# Patient Record
Sex: FEMALE | Race: WHITE | NOT HISPANIC OR LATINO | URBAN - METROPOLITAN AREA
[De-identification: names, ages, dates, MRNs, and addresses within clinical notes are randomized per-mention and may not be internally consistent; named-entity substitution may affect disease eponyms.]

---

## 2024-10-30 ENCOUNTER — OFFICE VISIT (OUTPATIENT)
Dept: OTOLARYNGOLOGY | Facility: CLINIC | Age: 79
End: 2024-10-30
Payer: COMMERCIAL

## 2024-10-30 VITALS — DIASTOLIC BLOOD PRESSURE: 102 MMHG | HEART RATE: 76 BPM | WEIGHT: 133.81 LBS | SYSTOLIC BLOOD PRESSURE: 151 MMHG

## 2024-10-30 DIAGNOSIS — J34.3 HYPERTROPHY OF INFERIOR NASAL TURBINATE: ICD-10-CM

## 2024-10-30 DIAGNOSIS — J20.9 ACUTE BRONCHITIS, UNSPECIFIED ORGANISM: ICD-10-CM

## 2024-10-30 DIAGNOSIS — J06.9 VIRAL URI WITH COUGH: Primary | ICD-10-CM

## 2024-10-30 DIAGNOSIS — L01.00 IMPETIGO: ICD-10-CM

## 2024-10-30 PROCEDURE — 1101F PT FALLS ASSESS-DOCD LE1/YR: CPT | Mod: CPTII,S$GLB,, | Performed by: PHYSICIAN ASSISTANT

## 2024-10-30 PROCEDURE — 99204 OFFICE O/P NEW MOD 45 MIN: CPT | Mod: S$GLB,,, | Performed by: PHYSICIAN ASSISTANT

## 2024-10-30 PROCEDURE — 1159F MED LIST DOCD IN RCRD: CPT | Mod: CPTII,S$GLB,, | Performed by: PHYSICIAN ASSISTANT

## 2024-10-30 PROCEDURE — 3077F SYST BP >= 140 MM HG: CPT | Mod: CPTII,S$GLB,, | Performed by: PHYSICIAN ASSISTANT

## 2024-10-30 PROCEDURE — 1126F AMNT PAIN NOTED NONE PRSNT: CPT | Mod: CPTII,S$GLB,, | Performed by: PHYSICIAN ASSISTANT

## 2024-10-30 PROCEDURE — 3080F DIAST BP >= 90 MM HG: CPT | Mod: CPTII,S$GLB,, | Performed by: PHYSICIAN ASSISTANT

## 2024-10-30 PROCEDURE — 99999 PR PBB SHADOW E&M-NEW PATIENT-LVL III: CPT | Mod: PBBFAC,,, | Performed by: PHYSICIAN ASSISTANT

## 2024-10-30 PROCEDURE — 3288F FALL RISK ASSESSMENT DOCD: CPT | Mod: CPTII,S$GLB,, | Performed by: PHYSICIAN ASSISTANT

## 2024-10-30 RX ORDER — MUPIROCIN 20 MG/G
OINTMENT TOPICAL 2 TIMES DAILY
Qty: 22 G | Refills: 0 | Status: SHIPPED | OUTPATIENT
Start: 2024-10-30 | End: 2024-11-06

## 2024-10-30 RX ORDER — BENZONATATE 100 MG/1
100 CAPSULE ORAL 3 TIMES DAILY PRN
Qty: 30 CAPSULE | Refills: 0 | Status: SHIPPED | OUTPATIENT
Start: 2024-10-30 | End: 2024-11-09

## 2024-10-30 RX ORDER — AZITHROMYCIN 250 MG/1
TABLET, FILM COATED ORAL
Qty: 6 TABLET | Refills: 0 | Status: SHIPPED | OUTPATIENT
Start: 2024-10-30 | End: 2024-11-04

## 2024-10-30 RX ORDER — MELOXICAM 15 MG/1
TABLET ORAL
COMMUNITY
Start: 2024-01-31

## 2024-10-30 RX ORDER — FLUTICASONE PROPIONATE 50 MCG
2 SPRAY, SUSPENSION (ML) NASAL DAILY
Qty: 16 G | Refills: 11 | Status: SHIPPED | OUTPATIENT
Start: 2024-10-30 | End: 2025-10-30

## 2024-11-18 PROBLEM — N18.31 STAGE 3A CHRONIC KIDNEY DISEASE: Status: ACTIVE | Noted: 2023-04-27

## 2024-11-18 PROBLEM — E78.00 PURE HYPERCHOLESTEROLEMIA: Status: ACTIVE | Noted: 2020-11-03

## 2024-11-18 PROBLEM — M17.11 PRIMARY OSTEOARTHRITIS OF RIGHT KNEE: Status: ACTIVE | Noted: 2023-04-27

## 2024-11-18 PROBLEM — M47.816 SPONDYLOSIS OF LUMBAR REGION WITHOUT MYELOPATHY OR RADICULOPATHY: Status: ACTIVE | Noted: 2019-07-10

## 2024-11-18 PROBLEM — I10 ESSENTIAL HYPERTENSION: Status: ACTIVE | Noted: 2023-04-27

## 2024-11-18 NOTE — PROGRESS NOTES
Blessing Salmon  1945        Subjective     Chief Complaint: Establish Care      History of Present Illness:  Ms. Blessing Salmon is a 79 y.o. female who presents to clinic for establishing care. Recently moved to [facility].      History of Present Illness    CHIEF COMPLAINT:  Ms. Salmon presents today for establishing care with a new PCP after moving from [place].    MEDICAL HISTORY:  She has a history of hypertension with suspected white coat syndrome. Home blood pressure readings typically range from  systolic. She was previously prescribed amlodipine 2.5 mg but did not renew it after moving. She has arthritis, managed with meloxicam 15 mg every other day as prescribed by her orthopedic surgeon. She uses Voltaren gel for topical pain relief and has received gel shots in her right knee. She had endometrial cancer in , treated with hysterectomy without chemotherapy. She has chronic back pain following surgery for a pinched sciatic nerve related to her arthritis. She has irritable bowel syndrome managed with stool softeners. She has eye implants for cataracts and uses OTC tear drops and allergy medication. She has sleep apnea and uses a nose strip at night, finding CPAP masks uncomfortable.    CURRENT MEDICATIONS:  Meloxicam 15 mg every other day, flaxseed oil, multivitamins, eye medications for implants, stool softeners. She recently discontinued Flonase, which was prescribed for an upper respiratory infection three weeks ago.    FAMILY HISTORY:  Her mother  from complications of valve replacement surgery. Her father was blind with poor lung function due to smoking, worked in a steel mill, and lived to 86 years old.    SOCIAL HISTORY:  She is a former smoker who quit in . She denies current alcohol use, reporting poor tolerance in the past. She exercises daily on a treadmill. She lives with her family, including her daughter, son-in-law, granddaughter, and great-grandson.    DIET:  She reports  "sensitivity to sugar, describing herself as "on the verge of being hypoglycemic" according to another physician. She experiences sugar crashes and manages this by consuming protein before sugar and using peanut butter as a go-to food. She denies being diabetic. Her main meal is now in the evening, which she acknowledges is atypical for her age.      ROS:  Constitutional: -weight loss  Musculoskeletal: +back pain  Neurological: -weakness, -tremors         PAST HISTORY:     Past Medical History:   Diagnosis Date    HLD (hyperlipidemia)     HTN (hypertension)        Past Surgical History:   Procedure Laterality Date    BACK SURGERY      HYSTERECTOMY         Family History   Problem Relation Name Age of Onset    Valvular heart disease Mother      Blindness Father         Social History     Socioeconomic History    Marital status:    Tobacco Use    Smoking status: Former     Types: Cigarettes    Smokeless tobacco: Never   Substance and Sexual Activity    Alcohol use: Not Currently     Social Drivers of Health     Financial Resource Strain: Low Risk  (12/7/2021)    Received from [place], [place] and [facility]    Financial Resource Strain     How hard is it for you to pay for the very basics like food, housing, medical care, and heating?: Not very hard   Food Insecurity: No Food Insecurity (12/7/2021)    Received from [place], [place] and [facility]    Food Insecurity     In the past 12 months, have you worried that your food would run out before you had money to buy more?: Never true     In the past 12 months, did you run out of food and didn't have money to buy more?: Never true   Transportation Needs: Unknown (12/7/2021)    Received from [place], [place] and [facility]    Transportation Needs     In the past 12 months, has lack of transportation kept you from medical appointments or from getting medications?: " "No       MEDICATIONS & ALLERGIES:     Current Outpatient Medications on File Prior to Visit   Medication Sig    [DISCONTINUED] meloxicam (MOBIC) 15 MG tablet TAKE 1 TABLET(15 MG) BY MOUTH DAILY PRN.  TAKE WITH FOOD.    FLAXSEED OIL-OMEGA 3,6,9 ORAL Take by mouth.    multivit,iron,minerals/lutein (CENTRUM SILVER ULTRA WOMEN'S ORAL) Take by mouth.    [DISCONTINUED] amLODIPine (NORVASC) 2.5 MG tablet Take 2.5 mg by mouth once daily.    [DISCONTINUED] fluticasone propionate (FLONASE) 50 mcg/actuation nasal spray 2 sprays (100 mcg total) by Each Nostril route once daily.     No current facility-administered medications on file prior to visit.       Review of patient's allergies indicates:   Allergen Reactions    Fluorometholone Swelling       OBJECTIVE:     Vital Signs:  Vitals:    11/19/24 0824   BP: (!) 144/100   BP Location: Right arm   Patient Position: Sitting   Pulse: 67   Resp: 18   Temp: 97.8 °F (36.6 °C)   TempSrc: Oral   SpO2: 98%   Weight: 61.8 kg (136 lb 3.9 oz)   Height: 5' 3" (1.6 m)       Body mass index is 24.13 kg/m².     Physical Exam:  Physical Exam  Vitals and nursing note reviewed.   Constitutional:       General: She is not in acute distress.     Appearance: She is not ill-appearing.   HENT:      Head: Normocephalic and atraumatic.      Mouth/Throat:      Mouth: Mucous membranes are moist.      Pharynx: Oropharynx is clear. No oropharyngeal exudate or posterior oropharyngeal erythema.   Eyes:      Extraocular Movements: Extraocular movements intact.      Conjunctiva/sclera: Conjunctivae normal.   Cardiovascular:      Rate and Rhythm: Normal rate and regular rhythm.   Pulmonary:      Effort: Pulmonary effort is normal. No respiratory distress.      Breath sounds: Normal breath sounds. No wheezing or rales.   Chest:      Chest wall: No tenderness.   Abdominal:      Palpations: Abdomen is soft.      Tenderness: There is no abdominal tenderness. There is no guarding.   Musculoskeletal:         General: " "Normal range of motion.      Cervical back: Normal range of motion and neck supple. No tenderness.      Right lower leg: No edema.      Left lower leg: No edema.   Lymphadenopathy:      Cervical: No cervical adenopathy.   Skin:     General: Skin is warm and dry.   Neurological:      Mental Status: She is alert and oriented to person, place, and time.            Laboratory  No results found for: "WBC", "HGB", "HCT", "MCV", "PLT"  No results found for: "GLU", "NA", "K", "CL", "CO2", "BUN", "CREATININE", "CALCIUM", "MG"  No results found for: "INR", "PROTIME"  No results found for: "HGBA1C"  No results for input(s): "POCTGLUCOSE" in the last 72 hours.      Health Maintenance         Date Due Completion Date    Lipid Panel Never done ---    TETANUS VACCINE Never done ---    Shingles Vaccine (2 of 3) 02/01/2017 12/7/2016    RSV Vaccine (Age 60+ and Pregnant patients) (1 - 1-dose 75+ series) Never done ---    COVID-19 Vaccine (4 - 2024-25 season) 09/01/2024 12/17/2021    DEXA Scan 07/11/2025 7/11/2022            ASSESSMENT & PLAN:   79 y.o. female who was seen today in clinic for establishing care    Hypertension, unspecified type  -     Hemoglobin A1C; Future; Expected date: 11/19/2024  -     Comprehensive Metabolic Panel; Future; Expected date: 11/19/2024  -     CBC Auto Differential; Future; Expected date: 11/19/2024  -     Lipid Panel; Future; Expected date: 11/19/2024  -     TSH; Future; Expected date: 11/19/2024    Pure hypercholesterolemia  -     Hemoglobin A1C; Future; Expected date: 11/19/2024  -     Comprehensive Metabolic Panel; Future; Expected date: 11/19/2024  -     CBC Auto Differential; Future; Expected date: 11/19/2024  -     Lipid Panel; Future; Expected date: 11/19/2024  -     TSH; Future; Expected date: 11/19/2024    EMMANUEL on CPAP    Spondylosis of lumbar spine  -     meloxicam (MOBIC) 15 MG tablet; Take 1 tablet (15 mg total) by mouth once daily.  Dispense: 30 tablet; Refill: 2    Primary osteoarthritis " of right knee  -     meloxicam (MOBIC) 15 MG tablet; Take 1 tablet (15 mg total) by mouth once daily.  Dispense: 30 tablet; Refill: 2    Chronic pain of right knee  -     meloxicam (MOBIC) 15 MG tablet; Take 1 tablet (15 mg total) by mouth once daily.  Dispense: 30 tablet; Refill: 2    Need for vaccination  -     Influenza - High Dose (65+) (PF) (IM)         1. Hypertension, unspecified type    2. Pure hypercholesterolemia    3. EMMANUEL on CPAP    4. Spondylosis of lumbar spine    5. Primary osteoarthritis of right knee    6. Chronic pain of right knee    7. Need for vaccination        Not on medication.  Discussed low salt diet.  Patient with normal BP reported at home.  Asymptomatic today.  Instructed to keep log at home and follow up in clinic to consider starting medication however concern for dropping BP to low with reported SBP in 70s-80s at times at home without symptoms.  Diet controlled.  Will get repeat lipid panel today  3.  Not using CPAP at home.  Instructed continued use as possible cause of elevation in BP  4/5/6.  Stable, continue home meds and follow up with orthopedics as needed    Assessment & Plan    Reassessed hypertension management; white coat syndrome suspected due to discrepancy between office and home readings  Decided against restarting amlodipine due to patient's history of low blood pressure at home  Continued meloxicam 15mg for arthritis pain management, acknowledging potential risks of long-term use  Noted patient's discontinuation of CPAP use; will monitor for sleep apnea symptoms or impact on blood pressure control  Considered potential link between uncontrolled hypertension and sleep apnea  Evaluated kidney function in context of NSAID use    ESSENTIAL HYPERTENSION:  - Discussed potential link between uncontrolled sleep apnea and hypertension.  - Ms. Shining to check blood pressure at home 2 times daily: once upon waking and once later in the day.  - Ms. [place] to keep a log of blood  pressure readings.  - Follow up in 1 month to reassess blood pressure control.  - Contact the office if home blood pressure readings consistently show systolic in 130s-140s or diastolic in 90s-100s.    HYPERLIPIDEMIA:  - Educated on Mediterranean diet for cholesterol management.    OSTEOARTHRITIS (RIGHT KNEE):  - Ms. [place] to continue using Voltaren gel for topical pain relief.  - Recommend incorporating Tylenol use to reduce frequency of meloxicam intake.  - Continued meloxicam 15mg, take every other day with food if possible, or as needed for pain.  - Refilled meloxicam prescription.    GENERAL HEALTH MONITORING:  - Comprehensive lab work ordered including cholesterol, electrolytes, and kidney function tests.    MEDICATION SAFETY:  - Explained risks associated with long-term NSAID use, including GI bleeding and kidney issues.             RTC in 1 month or sooner if needed    Elvis Jolly MD  [facility]    This note was generated with the assistance of ambient listening technology. Verbal consent was obtained by the patient and accompanying visitor(s) for the recording of patient appointment to facilitate this note. I attest to having reviewed and edited the generated note for accuracy, though some syntax or spelling errors may persist. Please contact the author of this note for any clarification.

## 2024-11-19 ENCOUNTER — TELEPHONE (OUTPATIENT)
Dept: INTERNAL MEDICINE | Facility: CLINIC | Age: 79
End: 2024-11-19

## 2024-11-19 ENCOUNTER — LAB VISIT (OUTPATIENT)
Dept: LAB | Facility: HOSPITAL | Age: 79
End: 2024-11-19
Payer: MEDICARE

## 2024-11-19 ENCOUNTER — OFFICE VISIT (OUTPATIENT)
Dept: INTERNAL MEDICINE | Facility: CLINIC | Age: 79
End: 2024-11-19
Payer: MEDICAID

## 2024-11-19 VITALS
SYSTOLIC BLOOD PRESSURE: 144 MMHG | HEIGHT: 63 IN | OXYGEN SATURATION: 98 % | BODY MASS INDEX: 24.14 KG/M2 | WEIGHT: 136.25 LBS | RESPIRATION RATE: 18 BRPM | TEMPERATURE: 98 F | DIASTOLIC BLOOD PRESSURE: 100 MMHG | HEART RATE: 67 BPM

## 2024-11-19 DIAGNOSIS — M47.816 SPONDYLOSIS OF LUMBAR SPINE: ICD-10-CM

## 2024-11-19 DIAGNOSIS — Z23 NEED FOR VACCINATION: ICD-10-CM

## 2024-11-19 DIAGNOSIS — M17.11 PRIMARY OSTEOARTHRITIS OF RIGHT KNEE: ICD-10-CM

## 2024-11-19 DIAGNOSIS — E78.00 PURE HYPERCHOLESTEROLEMIA: ICD-10-CM

## 2024-11-19 DIAGNOSIS — N18.30 STAGE 3 CHRONIC KIDNEY DISEASE, UNSPECIFIED WHETHER STAGE 3A OR 3B CKD: ICD-10-CM

## 2024-11-19 DIAGNOSIS — G89.29 CHRONIC PAIN OF RIGHT KNEE: ICD-10-CM

## 2024-11-19 DIAGNOSIS — I10 HYPERTENSION, UNSPECIFIED TYPE: ICD-10-CM

## 2024-11-19 DIAGNOSIS — M25.561 CHRONIC PAIN OF RIGHT KNEE: ICD-10-CM

## 2024-11-19 DIAGNOSIS — I10 HYPERTENSION, UNSPECIFIED TYPE: Primary | ICD-10-CM

## 2024-11-19 DIAGNOSIS — G47.33 OSA ON CPAP: ICD-10-CM

## 2024-11-19 PROBLEM — K58.1 IRRITABLE BOWEL SYNDROME WITH CONSTIPATION: Status: ACTIVE | Noted: 2020-11-03

## 2024-11-19 PROBLEM — M19.012 BILATERAL SHOULDER REGION ARTHRITIS: Status: ACTIVE | Noted: 2024-01-31

## 2024-11-19 PROBLEM — N89.0 VAIN I (VAGINAL INTRAEPITHELIAL NEOPLASIA GRADE I): Status: ACTIVE | Noted: 2017-10-17

## 2024-11-19 PROBLEM — M19.011 BILATERAL SHOULDER REGION ARTHRITIS: Status: ACTIVE | Noted: 2024-01-31

## 2024-11-19 PROBLEM — N18.31 STAGE 3A CHRONIC KIDNEY DISEASE: Status: RESOLVED | Noted: 2023-04-27 | Resolved: 2024-11-19

## 2024-11-19 LAB
ALBUMIN SERPL BCP-MCNC: 4.1 G/DL (ref 3.5–5.2)
ALP SERPL-CCNC: 55 U/L (ref 40–150)
ALT SERPL W/O P-5'-P-CCNC: 20 U/L (ref 10–44)
ANION GAP SERPL CALC-SCNC: 10 MMOL/L (ref 8–16)
AST SERPL-CCNC: 25 U/L (ref 10–40)
BASOPHILS # BLD AUTO: 0.09 K/UL (ref 0–0.2)
BASOPHILS NFR BLD: 1.4 % (ref 0–1.9)
BILIRUB SERPL-MCNC: 0.3 MG/DL (ref 0.1–1)
BUN SERPL-MCNC: 22 MG/DL (ref 8–23)
CALCIUM SERPL-MCNC: 9.7 MG/DL (ref 8.7–10.5)
CHLORIDE SERPL-SCNC: 106 MMOL/L (ref 95–110)
CHOLEST SERPL-MCNC: 243 MG/DL (ref 120–199)
CHOLEST/HDLC SERPL: 3.5 {RATIO} (ref 2–5)
CO2 SERPL-SCNC: 24 MMOL/L (ref 23–29)
CREAT SERPL-MCNC: 1 MG/DL (ref 0.5–1.4)
DIFFERENTIAL METHOD BLD: ABNORMAL
EOSINOPHIL # BLD AUTO: 0.5 K/UL (ref 0–0.5)
EOSINOPHIL NFR BLD: 8.1 % (ref 0–8)
ERYTHROCYTE [DISTWIDTH] IN BLOOD BY AUTOMATED COUNT: 12.1 % (ref 11.5–14.5)
EST. GFR  (NO RACE VARIABLE): 57 ML/MIN/1.73 M^2
ESTIMATED AVG GLUCOSE: 94 MG/DL (ref 68–131)
GLUCOSE SERPL-MCNC: 85 MG/DL (ref 70–110)
HBA1C MFR BLD: 4.9 % (ref 4–5.6)
HCT VFR BLD AUTO: 42.2 % (ref 37–48.5)
HDLC SERPL-MCNC: 70 MG/DL (ref 40–75)
HDLC SERPL: 28.8 % (ref 20–50)
HGB BLD-MCNC: 13.5 G/DL (ref 12–16)
IMM GRANULOCYTES # BLD AUTO: 0.04 K/UL (ref 0–0.04)
IMM GRANULOCYTES NFR BLD AUTO: 0.6 % (ref 0–0.5)
LDLC SERPL CALC-MCNC: 150.8 MG/DL (ref 63–159)
LYMPHOCYTES # BLD AUTO: 1.3 K/UL (ref 1–4.8)
LYMPHOCYTES NFR BLD: 21 % (ref 18–48)
MCH RBC QN AUTO: 29.7 PG (ref 27–31)
MCHC RBC AUTO-ENTMCNC: 32 G/DL (ref 32–36)
MCV RBC AUTO: 93 FL (ref 82–98)
MONOCYTES # BLD AUTO: 0.5 K/UL (ref 0.3–1)
MONOCYTES NFR BLD: 7.8 % (ref 4–15)
NEUTROPHILS # BLD AUTO: 3.9 K/UL (ref 1.8–7.7)
NEUTROPHILS NFR BLD: 61.1 % (ref 38–73)
NONHDLC SERPL-MCNC: 173 MG/DL
NRBC BLD-RTO: 0 /100 WBC
PLATELET # BLD AUTO: 160 K/UL (ref 150–450)
PMV BLD AUTO: 11.4 FL (ref 9.2–12.9)
POTASSIUM SERPL-SCNC: 4.5 MMOL/L (ref 3.5–5.1)
PROT SERPL-MCNC: 7.2 G/DL (ref 6–8.4)
RBC # BLD AUTO: 4.54 M/UL (ref 4–5.4)
SODIUM SERPL-SCNC: 140 MMOL/L (ref 136–145)
TRIGL SERPL-MCNC: 111 MG/DL (ref 30–150)
TSH SERPL DL<=0.005 MIU/L-ACNC: 2.13 UIU/ML (ref 0.4–4)
WBC # BLD AUTO: 6.39 K/UL (ref 3.9–12.7)

## 2024-11-19 PROCEDURE — 1160F RVW MEDS BY RX/DR IN RCRD: CPT | Mod: CPTII,S$GLB,,

## 2024-11-19 PROCEDURE — 83036 HEMOGLOBIN GLYCOSYLATED A1C: CPT

## 2024-11-19 PROCEDURE — 85025 COMPLETE CBC W/AUTO DIFF WBC: CPT

## 2024-11-19 PROCEDURE — 99999 PR PBB SHADOW E&M-EST. PATIENT-LVL III: CPT | Mod: PBBFAC,,,

## 2024-11-19 PROCEDURE — 3288F FALL RISK ASSESSMENT DOCD: CPT | Mod: CPTII,S$GLB,,

## 2024-11-19 PROCEDURE — 3077F SYST BP >= 140 MM HG: CPT | Mod: CPTII,S$GLB,,

## 2024-11-19 PROCEDURE — 36415 COLL VENOUS BLD VENIPUNCTURE: CPT | Mod: PO

## 2024-11-19 PROCEDURE — 84443 ASSAY THYROID STIM HORMONE: CPT

## 2024-11-19 PROCEDURE — 99214 OFFICE O/P EST MOD 30 MIN: CPT | Mod: 25,S$GLB,,

## 2024-11-19 PROCEDURE — 1101F PT FALLS ASSESS-DOCD LE1/YR: CPT | Mod: CPTII,S$GLB,,

## 2024-11-19 PROCEDURE — 1159F MED LIST DOCD IN RCRD: CPT | Mod: CPTII,S$GLB,,

## 2024-11-19 PROCEDURE — 90662 IIV NO PRSV INCREASED AG IM: CPT | Mod: S$GLB,,,

## 2024-11-19 PROCEDURE — 90471 IMMUNIZATION ADMIN: CPT | Mod: S$GLB,,,

## 2024-11-19 PROCEDURE — 80061 LIPID PANEL: CPT

## 2024-11-19 PROCEDURE — 80053 COMPREHEN METABOLIC PANEL: CPT

## 2024-11-19 PROCEDURE — 1126F AMNT PAIN NOTED NONE PRSNT: CPT | Mod: CPTII,S$GLB,,

## 2024-11-19 PROCEDURE — 3080F DIAST BP >= 90 MM HG: CPT | Mod: CPTII,S$GLB,,

## 2024-11-19 RX ORDER — MELOXICAM 15 MG/1
15 TABLET ORAL DAILY
Qty: 30 TABLET | Refills: 2 | Status: SHIPPED | OUTPATIENT
Start: 2024-11-19 | End: 2025-02-17

## 2024-11-19 RX ORDER — AMLODIPINE BESYLATE 2.5 MG/1
2.5 TABLET ORAL DAILY
COMMUNITY
End: 2024-11-19

## 2024-11-19 NOTE — TELEPHONE ENCOUNTER
----- Message from Elvis Jolly MD sent at 11/19/2024  1:41 PM CST -----  Patient is not on mychart.  Can we please call and inform of normal lab results, everything is within the normal limits.    Thank you

## 2024-12-19 NOTE — PROGRESS NOTES
Blessing Salmon  1945        Subjective     Chief Complaint: Follow-up (1 mo for HTN)      History of Present Illness:  Ms. Blessing Salmon is a 79 y.o. female who presents to clinic for follow up.       Feeling well today.  Has noticed worsening hearing loss bilaterally without ringing in ears.  Denies headaches, changes in vision, chest pain, dyspnea, lightheadedness, dizziness. BP log brought into clinic today with fluctuating BP throughout the day with no pattern noticed.  Ranging from SBP 90s to 150s.          Review of Systems   Constitutional:  Negative for fever.   Eyes:  Negative for pain.   Respiratory:  Negative for shortness of breath.    Cardiovascular:  Negative for chest pain, palpitations and leg swelling.   Musculoskeletal:  Positive for joint pain.   Neurological:  Negative for dizziness and headaches.        PAST HISTORY:     Past Medical History:   Diagnosis Date    HLD (hyperlipidemia)     HTN (hypertension)        Past Surgical History:   Procedure Laterality Date    BACK SURGERY      HYSTERECTOMY         Family History   Problem Relation Name Age of Onset    Valvular heart disease Mother      Blindness Father         Social History     Socioeconomic History    Marital status:    Tobacco Use    Smoking status: Former     Types: Cigarettes    Smokeless tobacco: Never   Substance and Sexual Activity    Alcohol use: Not Currently     Social Drivers of Health     Financial Resource Strain: Low Risk  (12/7/2021)    Received from [place], [place] and [facility]    Financial Resource Strain     How hard is it for you to pay for the very basics like food, housing, medical care, and heating?: Not very hard   Food Insecurity: No Food Insecurity (12/7/2021)    Received from [place], [place] and [facility]    Food Insecurity     In the past 12 months, have you worried that your food would run out before you had money to buy  "more?: Never true     In the past 12 months, did you run out of food and didn't have money to buy more?: Never true   Transportation Needs: Unknown (12/7/2021)    Received from [place], [place] and [facility]    Transportation Needs     In the past 12 months, has lack of transportation kept you from medical appointments or from getting medications?: No       MEDICATIONS & ALLERGIES:     Current Outpatient Medications on File Prior to Visit   Medication Sig    FLAXSEED OIL-OMEGA 3,6,9 ORAL Take by mouth.    multivit,iron,minerals/lutein (CENTRUM SILVER ULTRA WOMEN'S ORAL) Take by mouth.    [DISCONTINUED] meloxicam (MOBIC) 15 MG tablet Take 1 tablet (15 mg total) by mouth once daily.     No current facility-administered medications on file prior to visit.       Review of patient's allergies indicates:   Allergen Reactions    Fluorometholone Swelling       OBJECTIVE:     Vital Signs:  Vitals:    12/20/24 0852 12/20/24 0856   BP: (!) 156/92 (!) 152/98   BP Location: Right arm Left arm   Patient Position: Sitting Sitting   Pulse: 72    Resp: 18    Temp: 97 °F (36.1 °C)    TempSrc: Temporal    SpO2: 98%    Weight: 61.4 kg (135 lb 5.8 oz)    Height: 5' 3" (1.6 m)        Body mass index is 23.98 kg/m².     Physical Exam:  Physical Exam  Vitals and nursing note reviewed.   Constitutional:       General: She is not in acute distress.     Appearance: She is not ill-appearing.   HENT:      Head: Normocephalic and atraumatic.      Mouth/Throat:      Mouth: Mucous membranes are moist.      Pharynx: Oropharynx is clear. No oropharyngeal exudate or posterior oropharyngeal erythema.   Eyes:      Extraocular Movements: Extraocular movements intact.      Conjunctiva/sclera: Conjunctivae normal.   Cardiovascular:      Rate and Rhythm: Normal rate and regular rhythm.   Pulmonary:      Effort: Pulmonary effort is normal. No respiratory distress.      Breath sounds: Normal breath sounds. No wheezing or " "rales.   Chest:      Chest wall: No tenderness.   Abdominal:      Palpations: Abdomen is soft.      Tenderness: There is no abdominal tenderness. There is no guarding.   Musculoskeletal:         General: Normal range of motion.      Cervical back: Normal range of motion and neck supple. No tenderness.      Right lower leg: No edema.      Left lower leg: No edema.   Lymphadenopathy:      Cervical: No cervical adenopathy.   Skin:     General: Skin is warm and dry.   Neurological:      Mental Status: She is alert and oriented to person, place, and time.            Laboratory  Lab Results   Component Value Date    WBC 6.39 11/19/2024    HGB 13.5 11/19/2024    HCT 42.2 11/19/2024    MCV 93 11/19/2024     11/19/2024     Lab Results   Component Value Date    GLU 85 11/19/2024     11/19/2024    K 4.5 11/19/2024     11/19/2024    CO2 24 11/19/2024    BUN 22 11/19/2024    CREATININE 1.0 11/19/2024    CALCIUM 9.7 11/19/2024     No results found for: "INR", "PROTIME"  Lab Results   Component Value Date    HGBA1C 4.9 11/19/2024     No results for input(s): "POCTGLUCOSE" in the last 72 hours.      Health Maintenance         Date Due Completion Date    TETANUS VACCINE Never done ---    Shingles Vaccine (2 of 3) 02/01/2017 12/7/2016    RSV Vaccine (Age 60+ and Pregnant patients) (1 - 1-dose 75+ series) Never done ---    COVID-19 Vaccine (4 - 2024-25 season) 09/01/2024 12/17/2021    DEXA Scan 07/11/2025 7/11/2022    Lipid Panel 11/19/2029 11/19/2024            ASSESSMENT & PLAN:   79 y.o. female who was seen today in clinic for follow up    Essential hypertension    EMMANUEL on CPAP    Spondylosis of lumbar spine  -     meloxicam (MOBIC) 15 MG tablet; Take 1 tablet (15 mg total) by mouth once daily.  Dispense: 30 tablet; Refill: 2    Primary osteoarthritis of right knee  -     meloxicam (MOBIC) 15 MG tablet; Take 1 tablet (15 mg total) by mouth once daily.  Dispense: 30 tablet; Refill: 2  -     Ambulatory " referral/consult to Sports Medicine; Future; Expected date: 12/27/2024    Chronic pain of right knee  -     meloxicam (MOBIC) 15 MG tablet; Take 1 tablet (15 mg total) by mouth once daily.  Dispense: 30 tablet; Refill: 2  -     Ambulatory referral/consult to Sports Medicine; Future; Expected date: 12/27/2024    Hearing loss, unspecified hearing loss type, unspecified laterality  -     Ambulatory referral/consult to Audiology; Future; Expected date: 12/27/2024    Other orders  -     amLODIPine (NORVASC) 2.5 MG tablet; Take 1 tablet (2.5 mg total) by mouth once daily.  Dispense: 90 tablet; Refill: 3         1. Essential hypertension    2. EMMANUEL on CPAP    3. Spondylosis of lumbar spine    4. Primary osteoarthritis of right knee    5. Chronic pain of right knee    6. Hearing loss, unspecified hearing loss type, unspecified laterality        BP today elevated.  BP log at home fluctuating with no discernable pattern recognized.  Will start low dose amlodipine 2.5mg.  Strict RTC/ED precautions given.  Low salt diet.  Instructed to keep BP log at home and reach out with concerning readings.  Stable, unable to tolerate mask, has tried multiple ones with skin breakdown.  Does not have CPAP machine anymore.  Consider sleep medicine referral however patient declining at this time.  3/4/5.  Stable, previously received intermittent injections in right knee.  Sports medicine referral sent.  Continue home meloxicam.  6.  Noticed worsening hearing loss bilaterally.  No tinnitus reported.  Hearing test ordered.        RTC in 3 months or sooner if needed    Elvis Jolly MD  [facility]

## 2024-12-20 ENCOUNTER — OFFICE VISIT (OUTPATIENT)
Dept: INTERNAL MEDICINE | Facility: CLINIC | Age: 79
End: 2024-12-20
Payer: MEDICARE

## 2024-12-20 VITALS
TEMPERATURE: 97 F | DIASTOLIC BLOOD PRESSURE: 98 MMHG | WEIGHT: 135.38 LBS | SYSTOLIC BLOOD PRESSURE: 152 MMHG | BODY MASS INDEX: 23.99 KG/M2 | HEIGHT: 63 IN | RESPIRATION RATE: 18 BRPM | OXYGEN SATURATION: 98 % | HEART RATE: 72 BPM

## 2024-12-20 DIAGNOSIS — I10 ESSENTIAL HYPERTENSION: Primary | ICD-10-CM

## 2024-12-20 DIAGNOSIS — M25.561 CHRONIC PAIN OF RIGHT KNEE: ICD-10-CM

## 2024-12-20 DIAGNOSIS — G47.33 OSA ON CPAP: ICD-10-CM

## 2024-12-20 DIAGNOSIS — G89.29 CHRONIC PAIN OF RIGHT KNEE: ICD-10-CM

## 2024-12-20 DIAGNOSIS — M47.816 SPONDYLOSIS OF LUMBAR SPINE: ICD-10-CM

## 2024-12-20 DIAGNOSIS — M17.11 PRIMARY OSTEOARTHRITIS OF RIGHT KNEE: ICD-10-CM

## 2024-12-20 DIAGNOSIS — H91.90 HEARING LOSS, UNSPECIFIED HEARING LOSS TYPE, UNSPECIFIED LATERALITY: ICD-10-CM

## 2024-12-20 PROCEDURE — 99999 PR PBB SHADOW E&M-EST. PATIENT-LVL IV: CPT | Mod: PBBFAC,,,

## 2024-12-20 PROCEDURE — 99214 OFFICE O/P EST MOD 30 MIN: CPT | Mod: PBBFAC,PO

## 2024-12-20 RX ORDER — AMLODIPINE BESYLATE 2.5 MG/1
2.5 TABLET ORAL DAILY
Qty: 90 TABLET | Refills: 3 | Status: SHIPPED | OUTPATIENT
Start: 2024-12-20 | End: 2025-12-20

## 2024-12-20 RX ORDER — MELOXICAM 15 MG/1
15 TABLET ORAL DAILY
Qty: 30 TABLET | Refills: 2 | Status: SHIPPED | OUTPATIENT
Start: 2024-12-20 | End: 2025-03-20

## 2025-01-14 ENCOUNTER — PATIENT MESSAGE (OUTPATIENT)
Dept: SPORTS MEDICINE | Facility: CLINIC | Age: 80
End: 2025-01-14
Payer: MEDICARE

## 2025-01-14 DIAGNOSIS — M25.561 RIGHT KNEE PAIN, UNSPECIFIED CHRONICITY: Primary | ICD-10-CM

## 2025-01-16 ENCOUNTER — CLINICAL SUPPORT (OUTPATIENT)
Dept: AUDIOLOGY | Facility: CLINIC | Age: 80
End: 2025-01-16
Payer: MEDICARE

## 2025-01-16 DIAGNOSIS — H90.3 SENSORINEURAL HEARING LOSS (SNHL) OF BOTH EARS: ICD-10-CM

## 2025-01-16 PROCEDURE — 92557 COMPREHENSIVE HEARING TEST: CPT | Mod: PBBFAC

## 2025-01-16 PROCEDURE — 99999 PR PBB SHADOW E&M-EST. PATIENT-LVL I: CPT | Mod: PBBFAC,,,

## 2025-01-16 PROCEDURE — 99211 OFF/OP EST MAY X REQ PHY/QHP: CPT | Mod: PBBFAC

## 2025-01-16 PROCEDURE — 92567 TYMPANOMETRY: CPT | Mod: PBBFAC

## 2025-01-16 NOTE — PROGRESS NOTES
History:  Blessing Salmon, a 79 y.o. female, was seen today for an audiologic evaluation. She reported she recently moved in with family after living alone for years, and they have been telling her she is not hearing well. She noted some difficulty understanding soft-spoken loved ones and understanding in noise. Patient denied otalgia, aural fullness, tinnitus, and vertigo.     Results:  Otoscopy revealed clear view of tympanic membrane in the right ear and non-occlusive cerumen in the left ear.  Tympanometry revealed Type A tympanogram in the right ear and Type A tympanogram in the left ear.   Pure tone audiometry revealed mild to moderately severe sensorineural hearing loss in both ears.  Speech reception thresholds were obtained at 25 dB HL in the right ear and 35 dB HL in the left ear.  Word recognition scores were 100% in the right ear and 92% in the left ear.    Recommendations:  Hearing aid consultation if communication difficulties impact quality of life. Patient was advised to contact her insurance to ask about in network providers for hearing aids.  Use hearing protection when in noise.  Return for follow-up audiologic evaluation annually or sooner if a change in hearing is noted.  Recommend ENT consult for ear cleanings as needed.

## 2025-02-07 DIAGNOSIS — M25.561 RIGHT KNEE PAIN, UNSPECIFIED CHRONICITY: Primary | ICD-10-CM

## 2025-02-12 NOTE — PROGRESS NOTES
Subjective:     Blessing Salmon     No chief complaint on file.        MINERVA Funk is a 79 y.o. female coming in today for *** knee pain that began *** {DAY/WEEK/MONTH/YEAR:34982} ago, referred by ***. Pt. describes the pain as a {Numbers; 1-10:87075}/10 {Desc; Pain:90234} pain that {does/does not:200015} radiate. There {WAS WAS NOT:57093} a fall/injury/ or trauma associated with the onset of symptoms. The pain is better with *** and worse with ***. Pt. Denies any other musculoskeletal complaints at this time.     Joint instability?  Mechanical locking/clicking?   Affecting ADL's?  Affecting sleep?    Occupation:     Review of Systems   Constitutional:  Negative for chills and fever.   Musculoskeletal:  Positive for joint pain. Negative for back pain, falls, myalgias and neck pain.   Neurological:  Negative for dizziness, tingling, focal weakness, weakness and headaches.       PAST MEDICAL HISTORY:   Past Medical History:   Diagnosis Date    HLD (hyperlipidemia)     HTN (hypertension)      PAST SURGICAL HISTORY:   Past Surgical History:   Procedure Laterality Date    BACK SURGERY      HYSTERECTOMY       FAMILY HISTORY:   Family History   Problem Relation Name Age of Onset    Valvular heart disease Mother      Blindness Father       SOCIAL HISTORY:   Social History     Socioeconomic History    Marital status:    Tobacco Use    Smoking status: Former     Types: Cigarettes    Smokeless tobacco: Never   Substance and Sexual Activity    Alcohol use: Not Currently     Social Drivers of Health     Financial Resource Strain: Low Risk  (12/7/2021)    Received from [facility] [place], [place] [facility]    Financial Resource Strain     How hard is it for you to pay for the very basics like food, housing, medical care, and heating?: Not very hard   Food Insecurity: No Food Insecurity (12/7/2021)    Received from [facility] [place] [place] [facility]     Food Insecurity     In the past 12 months, have you worried that your food would run out before you had money to buy more?: Never true     In the past 12 months, did you run out of food and didn't have money to buy more?: Never true   Transportation Needs: Unknown (12/7/2021)    Received from [place], [place] [facility]    Transportation Needs     In the past 12 months, has lack of transportation kept you from medical appointments or from getting medications?: No       MEDICATIONS:     Current Outpatient Medications:     amLODIPine (NORVASC) 2.5 MG tablet, Take 1 tablet (2.5 mg total) by mouth once daily., Disp: 90 tablet, Rfl: 3    FLAXSEED OIL-OMEGA 3,6,9 ORAL, Take by mouth., Disp: , Rfl:     meloxicam (MOBIC) 15 MG tablet, Take 1 tablet (15 mg total) by mouth once daily., Disp: 30 tablet, Rfl: 2    multivit,iron,minerals/lutein (CENTRUM SILVER ULTRA WOMEN'S ORAL), Take by mouth., Disp: , Rfl:   ALLERGIES:   Review of patient's allergies indicates:   Allergen Reactions    Fluorometholone Swelling       Objective:     VITAL SIGNS: LMP  (LMP Unknown)    General    Nursing note and vitals reviewed.  Constitutional: She is oriented to person, place, and time. She appears well-developed and well-nourished.   HENT:   Head: Normocephalic and atraumatic.   no nasal discharge, no external ear redness or discharge   Eyes:   EOM is full and smooth  No eye redness or discharge   Neck: Neck supple. No tracheal deviation present.   Cardiovascular:  Normal rate.            2+ Radial pulse bilaterally  2+ Dorsalis Pedis pulse bilaterally  No LE edema appreciated   Pulmonary/Chest: Effort normal. No respiratory distress.   Abdominal: She exhibits no distension.   No rigidity   Neurological: She is alert and oriented to person, place, and time. She exhibits normal muscle tone. Coordination normal.   See details below   Psychiatric: She has a normal mood and affect. Her behavior is  "normal.             MUSCULOSKELETAL EXAM    {left/right:912392} KNEE EXAMINATION   Affected side is compared to contralateral knee     Observation:  No edema, erythema, ecchymosis, or effusion noted.  No muscle atrophy of the thighs and calves noted.  No obvious bony deformities noted.   No Genu valgus/varum noted.  No recurvatum noted.    No tibial internal/external torsion.    Posture:  {posture:49487::"Upright"}  Gait: {OMTgait:17184::"Non-antalgic"} with {ankle gait:72761::"Neutral"} ankle mechanics and {medial arch:87208}    Tenderness:  Patella - none    Lateral joint line - none  Quad tendon - none   Medial joint line - none  Patellar tendon - none  Medial plica - none  Tibial tubercle - none   Lateral plica - none  Pes anserine - none   MCL prox - none  Distal ITB - none   MCL distal - none  MFC - none    LCL prox - none  LFC - none    LCL distal - none  Tibia - none    Fibula - none    No obvious bursae, plicae, popliteal cysts, or tendon derangement palpated.          ROM (* = with pain):   Active extension to 0° on left without hyperextension, lag, crepitus, or patellar J sign.   Active extension to 0° on right without hyperextension, lag, crepitus, or patellar J sign.  Active flexion to 135° on left and 135° on right    Strength(* = with pain):  Knee Flexion - 5/5 on left and 5/5 on right  Knee Extension - 5/5 on left and 5/5 on right  Hip Flexion - 5/5 on left and 5/5 on right  Hip Extension - 5/5 on left and 5/5 on right  Ankle dorsiflexion - 5/5 on left and 5/5 on right  Ankle Plantarflexion - 5/5 on left and 5/5 on right  glutaeus medius - 5/5 on left and 5/5 on right    Patellofemoral Exam:   Patellar ballottement - negative  Bulge sign - negative  Patellar grind - negative    No patellar laxity with medial and lateral translation   No apprehension with medial and lateral patellar translation.     Meniscus Testing:     No pain with terminal extension and flexion at joint lines.  Leeanns test - " "negative   Thesaly test - negative  Bounce home test - negative    Ligament Testing:  Lachman's test - negative  No laxity with anterior drawer.  No laxity with posterior drawer.    No posterior sag sign.   Prone dial testing - negative  No laxity with varus testing at 0 and 30 degrees.  No laxity with valgus testing at 0 and 30 degrees.    IT band testing:  Robertos test - negative   Noble Compression test - negative    Neurovascular Examination:   Normal gait without antalgia.  Sensation intact to light touch in the obturator, lateral/intermediate/medial/posterior femoral cutaneous, saphenous, and common peroneal nerves bilaterally.  Hamstring/gluteal firing pattern normal and symmetric. Compensatory muscle firing pattern to ***  Motor Function:    Fully intact motor function at hip, knee, foot and ankle.  DTRs: 2+/4 reflexes at L4 and S1 dermatomes. No clonus. Downgoing Babinski.   Negative seated straight leg raise bilaterally.    Pulses intact at the DP and PT arteries bilaterally.    Capillary refill intact <2 seconds in all toes bilaterally.    TART (Tissue texture abnormality, Asymmetry,  Restriction of motion and/or Tenderness) changes:    Head: Occipitoatlantal (OA) Joint {OMTOA:34553::"Neutral"}     Cervical Spine   C1 {OMTcervical:40646::"Neutral"}   C2 {OMTcervical:66468::"Neutral"}   C3 {OMTcervical:94800::"Neutral"}   C4 {OMTcervical:37809::"Neutral"}   C5 {OMTcervical:45989::"Neutral"}   C6 {OMTcervical:19049::"Neutral"}   C7 {OMTcervical:92731::"Neutral"}      Thoracic Spine   T1 {OMTT/L:75201::"Neutral"}   T2 {OMTT/L:35962::"Neutral"}   T3 {OMTT/L:28719::"Neutral"}   T4 {OMTT/L:35202::"Neutral"}   T5 {OMTT/L:30361::"Neutral"}   T6 {OMTT/L:05580::"Neutral"}   T7 {OMTT/L:76408::"Neutral"}   T8 {OMTT/L:64613::"Neutral"}   T9 {OMTT/L:42752::"Neutral"}   T10 {OMTT/L:36915::"Neutral"}   T11 {OMTT/L:41806::"Neutral"}   T12 {OMTT/L:16003::"Neutral"}     Rib cage: Neutral     Lumbar Spine   L1 " "{OMTT/L:34859::"Neutral"}   L2 {OMTT/L:58279::"Neutral"}   L3 {OMTT/L:50528::"Neutral"}   L4 {OMTT/L:19310::"Neutral"}   L5 {OMTT/L:93390::"Neutral"}       Pelvis:  Innominate:{OMTinnom:75900}  Pubic bone:{OMTpub:50381}    Sacrum:{OMTsacrum:82490::"Neutral"}      Key   F= Flexed   E = Extended   R = Rotated   S = Sidebent   TTA = tissue texture abnormality     IMAGIN. X-ray ordered due to {LEFT/RIGHT/BILATERAL:89963} knee pain. (AP bilateral standing, PA bilateral standing in flexion, bilateral merchants, and  {LEFT/RIGHT/BILATERAL:69723} lateral views) taken today.   2. X-ray images were reviewed personally by me and then directly with patient.  3. FINDINGS: X-ray images obtained demonstrate no cortical irregularities, sclerosis, osteophyte formation, or subchonral cysts. There is no joint space narrowing.***  4. IMPRESSION: No pathology or irregularities appreciated.     Assessment:      No diagnosis found.       Plan:   1. ***   -  X-ray images of {LEFT/RIGHT:58384} knee taken today (AP bilateral standing, PA bilateral standing in flexion, bilateral merchants, and  {LEFT/RIGHT/BILATERAL:46159} lateral views) showed no abnormalities. Images were personally reviewed with patient.    2. OMT {DGLNUMBER:61342} regions. Oral consent obtained.  Reviewed benefits and potential side effects.   - OMT indicated today due to signs and symptoms as well as local and remote somatic dysfunction findings and their related neurokinetic, lymphatic, fascial and/or arteriovenous body connections.   - OMT techniques used: {OMT types:34131}   - Treatment was tolerated well. Improvement noted in segmental mobility post-treatment in dysfunctional regions. There were no adverse events and no complications immediately following treatment.     3. Pt. Given the following HEP:  A)   B)  22484 HOME EXERCISE PROGRAM (HEP):  The patient was taught a homegoing physical therapy regimen as described above. The patient demonstrated " understanding of the exercises and proper technique of their execution. This interaction took 15 minutes.       4. Follow-up in *** {TIME; UNIT WEEK - MONTH W/PLURAL:61514} for reevaluation    5. Patient agreeable to today's plan and all questions were answered    This note is dictated using the M*Modal Fluency Direct word recognition program. There are word recognition mistakes that are occasionally missed on review.

## 2025-02-13 ENCOUNTER — OFFICE VISIT (OUTPATIENT)
Dept: SPORTS MEDICINE | Facility: CLINIC | Age: 80
End: 2025-02-13
Attending: NEUROMUSCULOSKELETAL MEDICINE & OMM
Payer: MEDICARE

## 2025-02-13 ENCOUNTER — HOSPITAL ENCOUNTER (OUTPATIENT)
Dept: RADIOLOGY | Facility: HOSPITAL | Age: 80
Discharge: HOME | End: 2025-02-13
Attending: NEUROMUSCULOSKELETAL MEDICINE & OMM
Payer: MEDICARE

## 2025-02-13 VITALS — SYSTOLIC BLOOD PRESSURE: 146 MMHG | HEART RATE: 70 BPM | DIASTOLIC BLOOD PRESSURE: 96 MMHG

## 2025-02-13 DIAGNOSIS — M17.11 PRIMARY OSTEOARTHRITIS OF RIGHT KNEE: Primary | ICD-10-CM

## 2025-02-13 DIAGNOSIS — M25.561 CHRONIC PAIN OF RIGHT KNEE: ICD-10-CM

## 2025-02-13 DIAGNOSIS — G89.29 CHRONIC PAIN OF RIGHT KNEE: ICD-10-CM

## 2025-02-13 DIAGNOSIS — M99.03 SOMATIC DYSFUNCTION OF LUMBAR REGION: ICD-10-CM

## 2025-02-13 DIAGNOSIS — M99.02 SOMATIC DYSFUNCTION OF THORACIC REGION: ICD-10-CM

## 2025-02-13 DIAGNOSIS — M79.10 MYALGIA: ICD-10-CM

## 2025-02-13 DIAGNOSIS — M54.50 CHRONIC BILATERAL LOW BACK PAIN WITHOUT SCIATICA: ICD-10-CM

## 2025-02-13 DIAGNOSIS — M99.08 SOMATIC DYSFUNCTION OF RIB CAGE REGION: ICD-10-CM

## 2025-02-13 DIAGNOSIS — G89.29 CHRONIC BILATERAL LOW BACK PAIN WITHOUT SCIATICA: ICD-10-CM

## 2025-02-13 DIAGNOSIS — M99.04 SACRAL REGION SOMATIC DYSFUNCTION: ICD-10-CM

## 2025-02-13 DIAGNOSIS — M99.05 SOMATIC DYSFUNCTION OF PELVIC REGION: ICD-10-CM

## 2025-02-13 DIAGNOSIS — M25.561 RIGHT KNEE PAIN, UNSPECIFIED CHRONICITY: ICD-10-CM

## 2025-02-13 PROCEDURE — 99999 PR PBB SHADOW E&M-EST. PATIENT-LVL III: CPT | Mod: PBBFAC,,, | Performed by: NEUROMUSCULOSKELETAL MEDICINE & OMM

## 2025-02-13 PROCEDURE — 73564 X-RAY EXAM KNEE 4 OR MORE: CPT | Mod: TC,50,PO

## 2025-02-13 PROCEDURE — 73564 X-RAY EXAM KNEE 4 OR MORE: CPT | Mod: 26,50,, | Performed by: RADIOLOGY

## 2025-02-13 PROCEDURE — 98927 OSTEOPATH MANJ 5-6 REGIONS: CPT | Mod: PBBFAC,PO | Performed by: NEUROMUSCULOSKELETAL MEDICINE & OMM

## 2025-02-13 PROCEDURE — 99213 OFFICE O/P EST LOW 20 MIN: CPT | Mod: PBBFAC,25,PO | Performed by: NEUROMUSCULOSKELETAL MEDICINE & OMM

## 2025-02-13 NOTE — PROGRESS NOTES
"Subjective:     Blessing Salmon     Chief Complaint   Patient presents with    Right Knee - Pain         Pain  Pertinent negatives include no chills, fever, headaches, myalgias, neck pain or weakness.       Blessing is a 79 y.o. female coming in today for R knee pain that began 4 years ago. Last gel injection was 2 years ago, last cortisone was 1 year ago. Patient expressed that she has great relief with gel injections in the past. Pt. describes  her current pain as a 0/10 There was not a fall/injury/ or trauma associated with the onset of symptoms. Patient expresses that she is active and walks on a treadmill daily at a slow pace. Patient takes meloxicam 15mg every other day and Tylenol as needed. Pt. Denies any other musculoskeletal complaints at this time.   Of Note: Prior history of back and spine injections- She described having numbness down her right leg, but no radicular symptoms currently    Joint instability? No   Mechanical locking/clicking? "Squishes"  Affecting ADL's? No   Affecting sleep? No - ( Her Back does)     Occupation: Retired     Review of Systems   Constitutional:  Negative for chills and fever.   Musculoskeletal:  Positive for joint pain. Negative for back pain, falls, myalgias and neck pain.   Neurological:  Negative for dizziness, tingling, focal weakness, weakness and headaches.         PAST MEDICAL HISTORY:   Past Medical History:   Diagnosis Date    HLD (hyperlipidemia)     HTN (hypertension)      PAST SURGICAL HISTORY:   Past Surgical History:   Procedure Laterality Date    BACK SURGERY      HYSTERECTOMY       FAMILY HISTORY:   Family History   Problem Relation Name Age of Onset    Valvular heart disease Mother      Blindness Father       SOCIAL HISTORY:   Social History     Socioeconomic History    Marital status:    Tobacco Use    Smoking status: Former     Types: Cigarettes    Smokeless tobacco: Never   Substance and Sexual Activity    Alcohol use: Not Currently     Social " Drivers of Health     Financial Resource Strain: Low Risk  (12/7/2021)    Received from [place], [place] [facility]    Financial Resource Strain     How hard is it for you to pay for the very basics like food, housing, medical care, and heating?: Not very hard   Food Insecurity: No Food Insecurity (12/7/2021)    Received from [place], [place] [facility]    Food Insecurity     In the past 12 months, have you worried that your food would run out before you had money to buy more?: Never true     In the past 12 months, did you run out of food and didn't have money to buy more?: Never true   Transportation Needs: Unknown (12/7/2021)    Received from [place], [place] and [facility]    Transportation Needs     In the past 12 months, has lack of transportation kept you from medical appointments or from getting medications?: No       MEDICATIONS:     Current Outpatient Medications:     amLODIPine (NORVASC) 2.5 MG tablet, Take 1 tablet (2.5 mg total) by mouth once daily., Disp: 90 tablet, Rfl: 3    FLAXSEED OIL-OMEGA 3,6,9 ORAL, Take by mouth., Disp: , Rfl:     meloxicam (MOBIC) 15 MG tablet, Take 1 tablet (15 mg total) by mouth once daily., Disp: 30 tablet, Rfl: 2    multivit,iron,minerals/lutein (CENTRUM SILVER ULTRA WOMEN'S ORAL), Take by mouth., Disp: , Rfl:   ALLERGIES:   Review of patient's allergies indicates:   Allergen Reactions    Fluorometholone Swelling       Objective:     VITAL SIGNS: BP (!) 146/96 (Patient Position: Sitting)   Pulse 70   LMP  (LMP Unknown)    General    Nursing note and vitals reviewed.  Constitutional: She is oriented to person, place, and time. She appears well-developed and well-nourished.   HENT:   Head: Normocephalic and atraumatic.   no nasal discharge, no external ear redness or discharge   Eyes:   EOM is full and smooth  No eye redness or discharge   Neck: Neck supple. No  tracheal deviation present.   Cardiovascular:  Normal rate.            2+ Radial pulse bilaterally  2+ Dorsalis Pedis pulse bilaterally  No LE edema appreciated   Pulmonary/Chest: Effort normal. No respiratory distress.   Abdominal: She exhibits no distension.   No rigidity   Neurological: She is alert and oriented to person, place, and time. She exhibits normal muscle tone. Coordination normal.   See details below   Psychiatric: She has a normal mood and affect. Her behavior is normal.               MUSCULOSKELETAL EXAM    Right KNEE EXAMINATION   Affected side is compared to contralateral knee     Observation:  No edema, erythema, ecchymosis, or effusion noted.  No muscle atrophy of the thighs and calves noted.  No obvious bony deformities noted.   + Genu valgus noted.  No recurvatum noted.    No tibial internal/external torsion.    Posture:  Posterior pelvis tilt with loss of lumbar lordosis  Gait: Right antalgic with Neutral ankle mechanics and Neutral medial arch  Poor bilateral pelvic stability with bilateral hip hiking compensatory pattern noted with single leg raise    Tenderness:  Patella - none    Lateral joint line - +  Quad tendon - none   Medial joint line - none  Patellar tendon - none  Medial plica - none  Tibial tubercle - none   Lateral plica - none  Pes anserine - none   MCL prox - none  Distal ITB - none   MCL distal - none  MFC - none    LCL prox - none  LFC - none    LCL distal - none  Tibia - none    Fibula - none    No obvious bursae, plicae, popliteal cysts, or tendon derangement palpated.          ROM (* = with pain):   Active extension to 0° on left without hyperextension, lag, crepitus, or patellar J sign.   Active extension to 0° on right without hyperextension, lag, crepitus, or patellar J sign.  Active flexion to 135° on left and 135° on right*    Strength(* = with pain):  Knee Flexion - 5/5 on left and 5/5 on right  Knee Extension - 5/5 on left and 5/5 on right  Hip Flexion - 5/5 on  left and 5/5 on right  Hip Extension - 5/5 on left and 5/5 on right  Ankle dorsiflexion - 5/5 on left and 5/5 on right  Ankle Plantarflexion - 5/5 on left and 5/5 on right  glutaeus medius - 5-/5 on left and 5-/5 on right    Patellofemoral Exam:   Patellar ballottement - negative  Bulge sign - negative  Patellar grind - negative    No patellar laxity with medial and lateral translation   No apprehension with medial and lateral patellar translation.     Meniscus Testing:     + pain with terminal flexion at lateral joint line.  Leeanns test - positive lateral pain and crepitus     Ligament Testing:    No laxity with anterior drawer.  No laxity with posterior drawer.    No laxity with varus testing at 0 and 30 degrees.  No laxity with valgus testing at 0, + 30 degrees laxity but no pain    Neurovascular Examination:   Sensation intact to light touch in the obturator, lateral/intermediate/medial/posterior femoral cutaneous, saphenous, and common peroneal nerves bilaterally.  Motor Function:    Fully intact motor function at hip, knee, foot and ankle.  DTRs: 2+/4 reflexes at L4 and S1 dermatomes.  Negative seated straight leg raise bilaterally.    Pulses intact at the DP and PT arteries bilaterally.    Capillary refill intact <2 seconds in all toes bilaterally.    TART (Tissue texture abnormality, Asymmetry,  Restriction of motion and/or Tenderness) changes:     Thoracic Spine   T1 Neutral   T2 Neutral   T3 Neutral   T4 Neutral   T5 Neutral   T6 Neutral   T7 Neutral   T8 Neutral   T9 Neutral   T10 Neutral   T11 FRS LEFT   T12 FRS LEFT     Rib cage: R12 external torsion on left     Lumbar Spine   L1 NS-left,R-right   L2 NS-left,R-right   L3 NS-left,R-right   L4 FRS RIGHT   L5 FRS RIGHT       Pelvis:  Innominate:Left posterior rotation  Pubic bone:Left superior pubic shear    Sacrum:Right unilateral extension on right      Key   F= Flexed   E = Extended   R = Rotated   S = Sidebent   TTA = tissue texture abnormality      IMAGIN. X-ray ordered due to bilateral knee pain. (AP bilateral standing, PA bilateral standing in flexion, bilateral merchants, and  bilateral lateral views) taken today.   2. X-ray images were reviewed personally by me and then directly with patient.  3. FINDINGS:   Bones are demineralized. Narrowing involving the medial femoral tibial compartments with osteophytes. There is significant narrowing of the right lateral femoral tibial compartment on the standing flexion view. Degenerative change patellofemoral joints. No significant effusion.   4. 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    Primary osteoarthritis of right knee Yes    Chronic pain of right knee     Chronic bilateral low back pain without sciatica     Myalgia     Somatic dysfunction of lumbar region     Sacral region somatic dysfunction     Somatic dysfunction of pelvic region     Somatic dysfunction of rib cage region     Somatic dysfunction of thoracic region           Plan:   1. Chronic right knee pain secondary to DJD changes as noted on x-ray.   - Discussed conservative therapy of OA with brace wear, HEP, injection therapy (repeat viscosupplementation), Ice up to 20 minutes at a time, and  NSAIDs for breakthrough pain vs. Referral to orthopedic surgery for discussion on TKA. Pt. Would like to continue with conservative treatment at this time with a repeat viscosupplementation injection.   - OMT performed today to address associated biomechanical restrictions  and HEP started.   - continue meloxicam 15 mg p.o. q.day as needed for pain control  -  X-ray images of bilateral knee taken today (AP bilateral standing, PA bilateral standing in flexion, bilateral merchants, and  bilateral lateral views) showed Kellgren-Evan grade 3 OA on right, Kellgren-Evan grade 2-3 OA on left. Images were personally reviewed with patient.    2.  Chronic low back pain with associated biomechanical restrictions of the lower kinetic chain  - OMT performed today to address associated biomechanical restrictions  and HEP started.   - continue meloxicam 15 mg p.o. q.day as needed for pain control  - discussed option of referral to back and spine center for further evaluation, symptoms persist    3. OMT 5-6 regions. Oral consent obtained.  Reviewed benefits and potential side effects.   - OMT indicated today due to signs and symptoms as well as local and remote somatic dysfunction findings and their related neurokinetic, lymphatic, fascial and/or arteriovenous body connections.   - OMT techniques used: Myofascial Release, Muscle Energy, and Still's Technique   - Treatment was tolerated well. Improvement noted in segmental mobility post-treatment in dysfunctional regions. There were no adverse events and no complications immediately following treatment.     4. Pt. Given the following HEP:  A)  Pelvic clock exercises given to do from the 6-12 o'clock positions:10-15 reps, twice daily. Hand out of exercise also given.   B) Clam shell exercises bilaterally: hold leg in abducted and externally rotated position for 5-10 seconds, repeat 5-10 times  C) Supine lumbar rotation exercise with flexed knees:  Repeat 10 times, twice daily.  Handout given.  D) Lower abdominal muscle isotonic exercises: Rotate pelvis into the 6 o'clock position and holding it to engage lower abdominal muscles. Then lift up leg, one at a time, alternating for 10-15 reps. Repeat exercises 1-2 times daily. Handout given.   E) Bilateral seated quadriceps strengthening exercise: Straight leg raises with hip neural, hip externally rotated, and then hip internally rotated. 10-15 reps in each plane, twice daily.    74238 HOME EXERCISE PROGRAM (HEP):  The patient was taught a homegoing physical therapy regimen as described above. The patient demonstrated understanding of the exercises and proper technique  of their execution. This interaction took 15 minutes.     5. Follow-up in in 2 weeks for start of Euflexxa injection series in 2 weeks.      6. Patient agreeable to today's plan and all questions were answered    This note is dictated using the M*Modal Fluency Direct word recognition program. There are word recognition mistakes that are occasionally missed on review.

## 2025-02-27 ENCOUNTER — TELEPHONE (OUTPATIENT)
Dept: SPORTS MEDICINE | Facility: CLINIC | Age: 80
End: 2025-02-27

## 2025-02-27 ENCOUNTER — OFFICE VISIT (OUTPATIENT)
Dept: SPORTS MEDICINE | Facility: CLINIC | Age: 80
End: 2025-02-27
Payer: MEDICARE

## 2025-02-27 VITALS — SYSTOLIC BLOOD PRESSURE: 142 MMHG | HEART RATE: 74 BPM | DIASTOLIC BLOOD PRESSURE: 89 MMHG

## 2025-02-27 DIAGNOSIS — M25.461 EFFUSION OF RIGHT KNEE: ICD-10-CM

## 2025-02-27 DIAGNOSIS — M17.11 PRIMARY OSTEOARTHRITIS OF RIGHT KNEE: Primary | ICD-10-CM

## 2025-02-27 PROCEDURE — 99999PBSHW PR PBB SHADOW TECHNICAL ONLY FILED TO HB: Mod: JZ,PBBFAC,,

## 2025-02-27 PROCEDURE — 20611 DRAIN/INJ JOINT/BURSA W/US: CPT | Mod: PBBFAC,PO | Performed by: NEUROMUSCULOSKELETAL MEDICINE & OMM

## 2025-02-27 PROCEDURE — 99213 OFFICE O/P EST LOW 20 MIN: CPT | Mod: PBBFAC,PO | Performed by: NEUROMUSCULOSKELETAL MEDICINE & OMM

## 2025-02-27 PROCEDURE — 99999 PR PBB SHADOW E&M-EST. PATIENT-LVL III: CPT | Mod: PBBFAC,,, | Performed by: NEUROMUSCULOSKELETAL MEDICINE & OMM

## 2025-02-27 NOTE — TELEPHONE ENCOUNTER
----- Message from Geovany sent at 2/27/2025  3:32 PM CST -----  Type:  Needs Medical AdviceWho Called: pt Symptoms (please be specific):Knee hurts; Would the patient rather a call back or a response via [facility]? Call Best Call Back Number: [phone] Information: wondering if she can take mediation or remove wrap

## 2025-02-27 NOTE — PROGRESS NOTES
"Subjective:     Blessing Salmon     Chief Complaint   Patient presents with    Right Knee - Pain       Blessing is a 79 y.o. female coming in today for their 1st Euflexxa injection to the right knee.   Objective:     VITAL SIGNS: BP (!) 142/89 (Patient Position: Sitting)   Pulse 74   LMP  (LMP Unknown)      Euflexxa Injection Procedure #1     A time out was performed, including verification of patient ID, procedure, site and side, availability of information and equipment, review of safety issues, and agreement with consent, the procedure site was marked.    Location: Knee joint, right     Procedure: The patient was prepped aseptically with alcohol and chlorhexidine. Ethyl Chloride spray was used prior to skin puncture to help numb the superficial skin. After cold spray was applied, 2 cc of 1% Lidocaine was injected into the skin and superficial tissue at the injection site using a 25 G, 1.5" needle to form an anesthetic tunnel.  After local anesthetic was applied a 18 G, 1.5 needle was used to enter the right knee joint capsule under US guidance.7 cc of clear, yellow synovial fluid was aspirated. Following aspiration, using a hemostat, the syringe was exchanged with the Euflexxa syringe, and 2cc of Euflexxa was injected into the right knee joint. The patient was in the supine position during the duration of this procedure and the injection approach was from the superolateral aspect.     Ultrasound guidance was used for needle localization with SonoSite Edge 2, 9-L MHz linear probe(s). Images were saved and stored for documentation. The right knee joint was well visualized.  Dynamic visualization of the needle(s) was continuous throughout the procedure and maintained good position and correct needle placement.        Patient tolerance: The patient tolerated the procedure well with no immediate complications. There were no adverse reactions to the medication. Patient was instructed to apply ice to the joint for up to " 20 minutes at a time and avoid strenuous activities for 24-36 hours following the injection. The patient was warned of possible blood pressure changes during that time. Following that time, the patient can resume activities as prior to the injection.     The patient was reminded to call the clinic immediately for any adverse side effects as explained in clinic today.     Euflexxa:  Lot: [id number]  Exp: 11/11/2025    Assessment:      Encounter Diagnosis   Name Primary?    Primary osteoarthritis of right knee Yes        Plan:   1.first Euflexxa injection of right knee received today, with prior aspiration (see procedure note above).  Recommend right knee compression brace wear next 2 weeks to help control swelling.  2. Follow-up in 2 weeks for 2nd injection of 3 injection series  3. Patient agreeable to today's plan and all questions were answered    This note is dictated using the M*Modal Fluency Direct word recognition program. There are word recognition mistakes that are occasionally missed on review.

## 2025-02-28 ENCOUNTER — PATIENT OUTREACH (OUTPATIENT)
Dept: ADMINISTRATIVE | Facility: HOSPITAL | Age: 80
End: 2025-02-28
Payer: MEDICARE

## 2025-02-28 NOTE — TELEPHONE ENCOUNTER
Spoke with patient and informed her that she can switch to a compression sleeve and take Tylenol. Patient informed me that she was a little sore, but feels great today.

## 2025-03-05 ENCOUNTER — TELEPHONE (OUTPATIENT)
Dept: SPORTS MEDICINE | Facility: CLINIC | Age: 80
End: 2025-03-05
Payer: MEDICARE

## 2025-03-05 NOTE — TELEPHONE ENCOUNTER
----- Message from Shona sent at 3/5/2025  8:39 AM CST -----  Type:  Needs Medical AdviceWho Called: Pt Symptoms (please be specific): rash behind knee which has gotten worse and had to take off knee brace How long has patient had these symptoms:  Since 02/27Would the patient rather a call back or a response via [facility]? Call back Best Call Back Number: [phone] Information: Please be advised, pt states that symptoms started showing up from last visit w/ injection on 02/27, pt states that she would like to know what she can do to treat rash and how to keep knee brace on

## 2025-03-05 NOTE — TELEPHONE ENCOUNTER
Pt reports irritation with multiple compression sleeves and ace wrap. Very itchy and blistering. Better with OTC creams. Advised pt she could d/c the sleeves and continue OTC cream. We will check the area at her next visit on 3/13. Pt states understanding and appreciation.     Ivania Salas, MS, ATC, OTC  Clinical Assistant to Dr. Pilar Griffin

## 2025-03-10 ENCOUNTER — OFFICE VISIT (OUTPATIENT)
Dept: URGENT CARE | Facility: CLINIC | Age: 80
End: 2025-03-10
Payer: MEDICARE

## 2025-03-10 VITALS
OXYGEN SATURATION: 96 % | HEART RATE: 69 BPM | SYSTOLIC BLOOD PRESSURE: 139 MMHG | DIASTOLIC BLOOD PRESSURE: 100 MMHG | RESPIRATION RATE: 18 BRPM | BODY MASS INDEX: 23.92 KG/M2 | WEIGHT: 135 LBS | TEMPERATURE: 99 F | HEIGHT: 63 IN

## 2025-03-10 DIAGNOSIS — R05.9 COUGH, UNSPECIFIED TYPE: ICD-10-CM

## 2025-03-10 DIAGNOSIS — J40 BRONCHITIS: Primary | ICD-10-CM

## 2025-03-10 LAB
CTP QC/QA: YES
SARS CORONAVIRUS 2 ANTIGEN: NEGATIVE

## 2025-03-10 PROCEDURE — 99213 OFFICE O/P EST LOW 20 MIN: CPT | Mod: S$GLB,,, | Performed by: SURGERY

## 2025-03-10 PROCEDURE — 87811 SARS-COV-2 COVID19 W/OPTIC: CPT | Mod: QW,S$GLB,, | Performed by: SURGERY

## 2025-03-10 RX ORDER — ALBUTEROL SULFATE 90 UG/1
2 INHALANT RESPIRATORY (INHALATION) EVERY 6 HOURS PRN
Qty: 18 G | Refills: 0 | Status: SHIPPED | OUTPATIENT
Start: 2025-03-10 | End: 2026-03-10

## 2025-03-10 RX ORDER — BENZONATATE 200 MG/1
200 CAPSULE ORAL 3 TIMES DAILY PRN
Qty: 21 CAPSULE | Refills: 0 | Status: SHIPPED | OUTPATIENT
Start: 2025-03-10 | End: 2025-03-17

## 2025-03-10 RX ORDER — PROMETHAZINE HYDROCHLORIDE AND DEXTROMETHORPHAN HYDROBROMIDE 6.25; 15 MG/5ML; MG/5ML
5 SYRUP ORAL EVERY 4 HOURS PRN
Qty: 180 ML | Refills: 0 | Status: SHIPPED | OUTPATIENT
Start: 2025-03-10 | End: 2025-03-17

## 2025-03-10 NOTE — PROGRESS NOTES
"Subjective:      Patient ID: Blessing Salmon is a 79 y.o. female.    Vitals:  height is 5' 3" (1.6 m) and weight is 61.2 kg (135 lb). Her oral temperature is 98.7 °F (37.1 °C). Her blood pressure is 139/100 (abnormal) and her pulse is 69. Her respiration is 18 and oxygen saturation is 96%.     Chief Complaint: Cough    79 yr old female came in with complaints of coughing. Her symptoms started 3 days ago.    Cough      Respiratory:  Positive for cough.       Objective:     Physical Exam   Constitutional: She is oriented to person, place, and time. She appears well-developed. She is cooperative.  Non-toxic appearance. She does not appear ill. No distress.   HENT:   Head: Normocephalic and atraumatic.   Ears:   Right Ear: Hearing, tympanic membrane, external ear and ear canal normal.   Left Ear: Hearing, tympanic membrane, external ear and ear canal normal.   Nose: Nose normal. No mucosal edema, rhinorrhea or nasal deformity. No epistaxis. Right sinus exhibits no maxillary sinus tenderness and no frontal sinus tenderness. Left sinus exhibits no maxillary sinus tenderness and no frontal sinus tenderness.   Mouth/Throat: Uvula is midline, oropharynx is clear and moist and mucous membranes are normal. No trismus in the jaw. Normal dentition. No uvula swelling. No oropharyngeal exudate, posterior oropharyngeal edema or posterior oropharyngeal erythema.   Eyes: Conjunctivae and lids are normal. No scleral icterus.   Neck: Trachea normal and phonation normal. Neck supple. No edema present. No erythema present. No neck rigidity present.   Cardiovascular: Normal rate, regular rhythm, normal heart sounds and normal pulses.   Pulmonary/Chest: Effort normal and breath sounds normal. No respiratory distress. She has no decreased breath sounds. She has no rhonchi.   Abdominal: Normal appearance.   Musculoskeletal: Normal range of motion.         General: No deformity. Normal range of motion.   Neurological: She is alert and oriented " to person, place, and time. She exhibits normal muscle tone. Coordination normal.   Skin: Skin is warm, dry, intact, not diaphoretic and not pale.   Psychiatric: Her speech is normal and behavior is normal. Judgment and thought content normal.   Nursing note and vitals reviewed.      Assessment:     1. Bronchitis    2. Cough, unspecified type        Plan:       Bronchitis  -     benzonatate (TESSALON) 200 MG capsule; Take 1 capsule (200 mg total) by mouth 3 (three) times daily as needed for Cough.  Dispense: 21 capsule; Refill: 0  -     promethazine-dextromethorphan (PROMETHAZINE-DM) 6.25-15 mg/5 mL Syrp; Take 5 mLs by mouth every 4 (four) hours as needed (cough).  Dispense: 180 mL; Refill: 0  -     albuterol (PROVENTIL/VENTOLIN HFA) 90 mcg/actuation inhaler; Inhale 2 puffs into the lungs every 6 (six) hours as needed for Wheezing. Rescue  Dispense: 18 g; Refill: 0    Cough, unspecified type  -     SARS Coronavirus 2 Antigen, POCT Manual Read      Results for orders placed or performed in visit on 03/10/25   SARS Coronavirus 2 Antigen, POCT Manual Read    Collection Time: 03/10/25  6:47 PM   Result Value Ref Range    SARS Coronavirus 2 Antigen Negative Negative, Presumptive Negative     Acceptable Yes

## 2025-03-12 NOTE — PROGRESS NOTES
"Subjective:     Blessing Salmon     Chief Complaint   Patient presents with    Right Knee - Pain     Blessing is a 79 y.o. female coming in today for their 2nd Euflexxa injection to the right knee.   Objective:   VITAL SIGNS: /86 (Patient Position: Sitting)   Pulse 83   LMP  (LMP Unknown)      Euflexxa Injection Procedure #2     A time out was performed, including verification of patient ID, procedure, site and side, availability of information and equipment, review of safety issues, and agreement with consent, the procedure site was marked.    Location: Knee joint, right     Procedure: The patient was prepped aseptically with alcohol and chlorhexidine. Ethyl Chloride spray was used prior to skin puncture to help numb the superficial skin. After cold spray was applied, 2 cc's of 0.2% Naropin was injected into the skin and superficial tissue at the injection site using a 21 G, 2" needle to form an anesthetic tunnel and ensure proper needle placement into the right knee joint space. Using a hemostat, the syringe was exchanged with the Euflexxa syringe, and 2cc of Euflexxa was injected into the right knee joint. The patient was in the supine position during the duration of this procedure and the injection approach was from the superolateral aspect.     Ultrasound guidance was used for needle localization with SonoSite Edge 2, 9-L MHz linear probe(s). Images were saved and stored for documentation. The right knee joint was well visualized,noting only a trace effusion.  Dynamic visualization of the needle(s) was continuous throughout the procedure and maintained good position and correct needle placement.        Patient tolerance: The patient tolerated the procedure well with no immediate complications. There were no adverse reactions to the medication. Patient was instructed to apply ice to the joint for up to 20 minutes at a time and avoid strenuous activities for 24-36 hours following the injection. The patient " was warned of possible blood pressure changes during that time. Following that time, the patient can resume activities as prior to the injection.     The patient was reminded to call the clinic immediately for any adverse side effects as explained in clinic today.     Euflexxa:  Lot: [id number]  Exp: 11/11/2025    Assessment:      Encounter Diagnosis   Name Primary?    Primary osteoarthritis of right knee Yes        Plan:   1.second Euflexxa injection of right knee received today (see procedure note above).  Recommend right knee ace wrap wear with increased activity.   2. Follow-up in 2 weeks for 3rd injection of 3 injection series  3. Patient agreeable to today's plan and all questions were answered    This note is dictated using the M*Modal Fluency Direct word recognition program. There are word recognition mistakes that are occasionally missed on review.

## 2025-03-13 ENCOUNTER — OFFICE VISIT (OUTPATIENT)
Dept: SPORTS MEDICINE | Facility: CLINIC | Age: 80
End: 2025-03-13
Payer: MEDICARE

## 2025-03-13 VITALS — SYSTOLIC BLOOD PRESSURE: 126 MMHG | DIASTOLIC BLOOD PRESSURE: 86 MMHG | HEART RATE: 83 BPM

## 2025-03-13 DIAGNOSIS — M17.11 PRIMARY OSTEOARTHRITIS OF RIGHT KNEE: Primary | ICD-10-CM

## 2025-03-13 PROCEDURE — 99999PBSHW PR PBB SHADOW TECHNICAL ONLY FILED TO HB: Mod: JZ,PBBFAC,,

## 2025-03-13 PROCEDURE — 20611 DRAIN/INJ JOINT/BURSA W/US: CPT | Mod: PBBFAC,PO | Performed by: NEUROMUSCULOSKELETAL MEDICINE & OMM

## 2025-03-13 PROCEDURE — 99999 PR PBB SHADOW E&M-EST. PATIENT-LVL III: CPT | Mod: PBBFAC,,, | Performed by: NEUROMUSCULOSKELETAL MEDICINE & OMM

## 2025-03-13 PROCEDURE — 99213 OFFICE O/P EST LOW 20 MIN: CPT | Mod: PBBFAC,PO | Performed by: NEUROMUSCULOSKELETAL MEDICINE & OMM

## 2025-03-13 RX ADMIN — Medication 20 MG: at 08:03

## 2025-03-17 ENCOUNTER — OFFICE VISIT (OUTPATIENT)
Dept: INTERNAL MEDICINE | Facility: CLINIC | Age: 80
End: 2025-03-17
Payer: MEDICARE

## 2025-03-17 VITALS
SYSTOLIC BLOOD PRESSURE: 128 MMHG | WEIGHT: 136 LBS | OXYGEN SATURATION: 97 % | HEIGHT: 63 IN | HEART RATE: 73 BPM | TEMPERATURE: 98 F | RESPIRATION RATE: 16 BRPM | DIASTOLIC BLOOD PRESSURE: 82 MMHG | BODY MASS INDEX: 24.1 KG/M2

## 2025-03-17 DIAGNOSIS — J06.9 UPPER RESPIRATORY INFECTION WITH COUGH AND CONGESTION: Primary | ICD-10-CM

## 2025-03-17 PROCEDURE — 99999 PR PBB SHADOW E&M-EST. PATIENT-LVL IV: CPT | Mod: PBBFAC,,,

## 2025-03-17 PROCEDURE — 99214 OFFICE O/P EST MOD 30 MIN: CPT | Mod: PBBFAC,PO

## 2025-03-17 PROCEDURE — 99214 OFFICE O/P EST MOD 30 MIN: CPT | Mod: S$PBB,,,

## 2025-03-17 RX ORDER — GUAIFENESIN 1200 MG/1
1200 TABLET, EXTENDED RELEASE ORAL EVERY 12 HOURS
Qty: 10 TABLET | Refills: 0 | Status: SHIPPED | OUTPATIENT
Start: 2025-03-17 | End: 2025-03-22

## 2025-03-17 RX ORDER — AZITHROMYCIN 250 MG/1
TABLET, FILM COATED ORAL
Qty: 6 TABLET | Refills: 0 | Status: SHIPPED | OUTPATIENT
Start: 2025-03-17 | End: 2025-03-22

## 2025-03-17 RX ORDER — LEVOCETIRIZINE DIHYDROCHLORIDE 5 MG/1
5 TABLET, FILM COATED ORAL NIGHTLY
Qty: 10 TABLET | Refills: 0 | Status: SHIPPED | OUTPATIENT
Start: 2025-03-17

## 2025-03-17 NOTE — PROGRESS NOTES
[facility] Note    SUBJECTIVE:      History of Present Illness    CHIEF COMPLAINT:  Ms. Salmon presents today for persistent cough and congestion.    RESPIRATORY SYMPTOMS:  She presents with a progressively worsening cough that evolved from non-productive to productive with medication use. The cough exacerbates when lying flat, requiring up to 30 minutes to subside, forcing her to sleep in an upright position. She experiences shortness of breath with difficulty walking short distances and needs to rest to catch her breath. She denies wheezing.    ENT SYMPTOMS:  She reports feeling her ear is clogged, describing it as hearing through a tunnel. She experiences congestion. She denies fever, chills, and sore throat.    ASSOCIATED SYMPTOMS:  She denies nausea, vomiting, abdominal pain, and headaches. She only experiences dizziness during prolonged coughing episodes lasting up to 30 minutes.    CURRENT MEDICATIONS:  She is currently taking promethazine, albuterol, and benzonatate prescribed from Urgent Care. She has discontinued taking vitamins.    ALLERGIES:  She reports being immune to penicillin rather than allergic to it. She denies any other allergies.    SOCIAL HISTORY:  She lives with her daughter, granddaughter, son-in-law, and three dogs.          Review of Systems   Constitutional:  Negative for chills and fever.   HENT:  Positive for congestion. Negative for sore throat.    Eyes: Negative.    Respiratory:  Positive for cough and sputum production. Negative for shortness of breath and wheezing.    Cardiovascular: Negative.    Gastrointestinal: Negative.    Genitourinary: Negative.    Musculoskeletal: Negative.    Neurological:  Negative for dizziness and headaches.   Psychiatric/Behavioral: Negative.           PAST HISTORY:     The patient's list of active medical history, family/social history, medications, and allergies as documented has been reviewed.     Past Medical History:  Past Medical  "History:   Diagnosis Date    HLD (hyperlipidemia)     HTN (hypertension)        Past Surgical History:  Past Surgical History:   Procedure Laterality Date    BACK SURGERY      HYSTERECTOMY         Family History:  family history includes Blindness in her father; Valvular heart disease in her mother.     Social History:  Social History[1]    MEDICATIONS AND ALLERGIES:    Medications:  Encounter Medications[2]    Allergies:  Review of patient's allergies indicates:   Allergen Reactions    Fluorometholone Swelling       Health Maintenance:  Health Maintenance   Topic Date Due    TETANUS VACCINE  Never done    Shingles Vaccine (2 of 3) 02/01/2017    RSV Vaccine (Age 60+ and Pregnant patients) (1 - 1-dose 75+ series) Never done    COVID-19 Vaccine (4 - 2024-25 season) 09/01/2024    DEXA Scan  07/11/2025    Lipid Panel  11/19/2029    Influenza Vaccine  Completed    Pneumococcal Vaccines (Age 50+)  Completed     Health Maintenance Topics with due status: Not Due       Topic Last Completion Date    DEXA Scan 07/11/2022    Lipid Panel 11/19/2024       OBJECTIVE:      VITAL SIGNS    Vital Signs  Temp: 97.7 °F (36.5 °C)  Temp Source: Oral  Pulse: 73  Resp: 16  SpO2: 97 %  BP: 128/82  BP Location: Left arm  Patient Position: Sitting  Pain Score:   2  Height and Weight  Height: 5' 3" (160 cm)  Weight: 61.7 kg (136 lb)  BSA (Calculated - sq m): 1.66 sq meters  BMI (Calculated): 24.1  Weight in (lb) to have BMI = 25: 140.8]    Physical Exam  Vitals and nursing note reviewed.   Constitutional:       Appearance: Normal appearance.   HENT:      Head: Normocephalic and atraumatic.      Right Ear: Ear canal and external ear normal. Tympanic membrane has decreased mobility.      Left Ear: Tympanic membrane, ear canal and external ear normal.      Nose: Nose normal.      Right Turbinates: Swollen.      Left Turbinates: Swollen.      Right Sinus: No maxillary sinus tenderness or frontal sinus tenderness.      Left Sinus: No maxillary " sinus tenderness or frontal sinus tenderness.      Mouth/Throat:      Lips: Pink.      Mouth: Mucous membranes are dry.      Pharynx: Oropharynx is clear. Uvula midline. Posterior oropharyngeal erythema present.   Eyes:      Pupils: Pupils are equal, round, and reactive to light.   Cardiovascular:      Rate and Rhythm: Normal rate and regular rhythm.      Pulses: Normal pulses.      Heart sounds: Normal heart sounds.   Pulmonary:      Effort: Pulmonary effort is normal.      Breath sounds: Normal breath sounds and air entry.   Musculoskeletal:      Cervical back: Normal range of motion and neck supple.   Lymphadenopathy:      Head:      Right side of head: No submental, submandibular, tonsillar, preauricular, posterior auricular or occipital adenopathy.      Left side of head: No submental, submandibular, tonsillar, preauricular, posterior auricular or occipital adenopathy.      Cervical: No cervical adenopathy.      Right cervical: No superficial cervical adenopathy.     Left cervical: No superficial cervical adenopathy.   Skin:     General: Skin is warm.   Neurological:      General: No focal deficit present.      Mental Status: She is alert and oriented to person, place, and time.   Psychiatric:         Mood and Affect: Mood normal.          Laboratory:    CBC:  Recent Labs   Lab 11/19/24 0930   WBC 6.39   RBC 4.54   Hemoglobin 13.5   Hematocrit 42.2   Platelets 160   MCV 93   MCH 29.7   MCHC 32.0     CMP:  Recent Labs   Lab 11/19/24 0930   Glucose 85   Calcium 9.7   Albumin 4.1   Total Protein 7.2   Sodium 140   Potassium 4.5   CO2 24   Chloride 106   BUN 22   Alkaline Phosphatase 55   ALT 20   AST 25   Total Bilirubin 0.3     URINALYSIS:       LIPIDS:  Recent Labs   Lab 11/19/24  0930   TSH 2.134   HDL 70   Cholesterol 243 H   Triglycerides 111   LDL Cholesterol 150.8   HDL/Cholesterol Ratio 28.8   Non-HDL Cholesterol 173   Total Cholesterol/HDL Ratio 3.5     TSH:  Recent Labs   Lab 11/19/24  0930   TSH 2.134      A1C:  Recent Labs   Lab 11/19/24  0930   Hemoglobin A1C 4.9       Radiology:          ASSESSMENT AND PLAN:     Blessing Salmon is a 79 y.o.female with:    Assessment & Plan    IMPRESSION:   Assessed persistent cough, ongoing for over 1 week and worsened despite previous treatment.   Noted clear lung sounds on exam.   Prescribed azithromycin (Z-Scott) due to possible bacterial infection, considering duration of symptoms.   Recommend combination of symptomatic relief medications:    ACUTE COUGH:  1. Explained that persistent coughs can last up to 3-6 weeks in some cases.  2. Educated the patient on benefits of local honey (from [place] bees) for cough relief due to its connection with local [employer].  3. Advised the patient to use hot lemon and honey or honey substitute for sore throat relief.  4. Instructed the patient to perform warm salt water gargles.  5. Recommend the use of throat lozenges or chloraseptic spray for symptomatic relief.  6. Prescribed guaifenesin (Mucinex) 1200 mg twice daily to thin mucus.  7. Prescribed levocetirizine (Xyzal) 1 pill at night for 10 days to dry secretions and reduce coughing.  8. Recommend OTC Delsym (dextromethorphan) cough syrup as needed for cough suppression.  9. Prescribed Z-Scott (azithromycin) for antibiotic treatment.  10. Auscultated the patient's lungs and found them clear despite prolonged cough.  11. Noted that the patient's cough has been persistent for over a week, initially non-productive but now producing liquid, worsening when lying flat and disrupting sleep.    SHORTNESS OF BREATH:  1. Noted that the patient is experiencing shortness of breath, especially after walking short distances.  2. Prescribed treatment regimen including antibiotics, guaifenesin, and antihistamines, which should help alleviate shortness of breath associated with the cough.    LEFT EAR DISORDER:  1. Examined the left ear and found a small amount of fluid behind the eardrum, but no  infection.  2. The eardrum appears pearly gray, which is normal.  3. Noted that the patient reports feeling of clogged ear and hearing through a tunnel in the left ear.  4. Recommend nasal spray (Azelastine) to help with ear congestion.    DIZZINESS:  1. Noted that the patient is experiencing dizziness after prolonged coughing episodes.  2. Prescribed treatment regimen to address the cough, which should indirectly help with the associated dizziness.    NASAL CONGESTION:  1. Prescribed Azelastine nasal spray, 2 sprays daily or 1 spray twice daily to address congestion (selected due to reported allergy to fluorometholone).  2. Instructed on proper administration technique for nasal spray to ensure effectiveness.  3. Prescribed levocetirizine (Xyzal) to address nasal congestion.    ALLERGY STATUS:  1. Noted an allergy to Fluorometholone on the patient's allergy list, which is an analgesic agent.  2. Avoided prescribing Flonase due to potential cross-reactivity with Fluorometholone.  3. Noted the patient's reported penicillin immunity due to frequent use in childhood for strep throat.  4. Prescribed azithromycin (Z-Linda) instead of penicillin as an alternative antibiotic.    GENERAL RECOMMENDATIONS AND FOLLOW-UP:  1. Discussed importance of sleep for body's healing process.  2. Contact office if not feeling better in about 48 hours.  3. Follow up with one of doctor's colleagues if needed before doctor returns next Monday.        Symptomatic treatement regimen:  Plain mucinex, 1200mg twice per day  OTC antihistamine  Astelin nasal spray: 1 spray twice per day  Hot lemon and honey or honey substitute or hot tea  Warm salt water gargles  Throat lozenges  Chloraseptic spray  Rehydrate!  LOTS of WATER; if you want to drink 1-2 pedialyte or sugar free gatorade    If not feeling better in another 5 days, please return to clinic or go to ER.      Upper respiratory infection with cough and congestion  -     azithromycin (Z-LINDA)  250 MG tablet; Take 2 tablets by mouth on day 1; Take 1 tablet by mouth on days 2-5  Dispense: 6 tablet; Refill: 0  -     guaiFENesin (MUCINEX) 1,200 mg Ta12; Take 1,200 mg by mouth every 12 (twelve) hours. for 5 days  Dispense: 10 tablet; Refill: 0  -     levocetirizine (XYZAL) 5 MG tablet; Take 1 tablet (5 mg total) by mouth every evening.  Dispense: 10 tablet; Refill: 0         -Continue current medications and maintain follow up with specialists.  Return to clinic in Follow up if symptoms worsen or fail to improve.          Gabrielle Lowry, MSN, APRN, FNP-C  [facility]    This note was generated with the assistance of ambient listening technology. Verbal consent was obtained by the patient and accompanying visitor(s) for the recording of patient appointment to facilitate this note. I attest to having reviewed and edited the generated note for accuracy, though some syntax or spelling errors may persist. Please contact the author of this note for any clarification.      I spent 34 minutes on the day of this encounter for preparing for, evaluating, treating, and managing this patient.    This includes face-to-face time and non face-to-face time and includes the following:  --preparing to see the patient (eg, obtaining and/or reviewing old records such as, when applicable, primary care notes, specialist notes, hospital notes, review of laboratory tests, and/or radiographic and/or cardiology or other studies)  --performing a medically appropriate review of systems and examination and/or evaluation  --reviewing and independently interpreting results (not separately reported; eg, lab results) and communicating results to the patient and/or family/caregiver  --placing orders and/or reviewing other physician's orders which can both include medications, laboratory studies, radiographic studies, procedures, referrals etcetera and   --counseling and educating the patient and/or family  member/caregiver regarding the treatment plan  --documentation of the visit in the electronic health record  --communicating with other health care providers regarding referrals, studies, follow-up, etc            [1]   Social History  Socioeconomic History    Marital status:    Tobacco Use    Smoking status: Former     Types: Cigarettes    Smokeless tobacco: Never   Substance and Sexual Activity    Alcohol use: Not Currently     Social Drivers of Health     Financial Resource Strain: Low Risk  (12/7/2021)    Received from [place], [place] and [facility]    Financial Resource Strain     How hard is it for you to pay for the very basics like food, housing, medical care, and heating?: Not very hard   Food Insecurity: No Food Insecurity (12/7/2021)    Received from [place] [place] and [facility]    Food Insecurity     In the past 12 months, have you worried that your food would run out before you had money to buy more?: Never true     In the past 12 months, did you run out of food and didn't have money to buy more?: Never true   Transportation Needs: Unknown (12/7/2021)    Received from [place] [place] [facility]    Transportation Needs     In the past 12 months, has lack of transportation kept you from medical appointments or from getting medications?: No   [2]   Outpatient Encounter Medications as of 3/17/2025   Medication Sig Dispense Refill    albuterol (PROVENTIL/VENTOLIN HFA) 90 mcg/actuation inhaler Inhale 2 puffs into the lungs every 6 (six) hours as needed for Wheezing. Rescue 18 g 0    amLODIPine (NORVASC) 2.5 MG tablet Take 1 tablet (2.5 mg total) by mouth once daily. 90 tablet 3    benzonatate (TESSALON) 200 MG capsule Take 1 capsule (200 mg total) by mouth 3 (three) times daily as needed for Cough. 21 capsule 0    FLAXSEED OIL-OMEGA 3,6,9 ORAL Take by mouth.      meloxicam (MOBIC) 15 MG  tablet Take 1 tablet (15 mg total) by mouth once daily. 30 tablet 2    multivit,iron,minerals/lutein (CENTRUM SILVER ULTRA WOMEN'S ORAL) Take by mouth.      promethazine-dextromethorphan (PROMETHAZINE-DM) 6.25-15 mg/5 mL Syrp Take 5 mLs by mouth every 4 (four) hours as needed (cough). 180 mL 0    azithromycin (Z-LINDA) 250 MG tablet Take 2 tablets by mouth on day 1; Take 1 tablet by mouth on days 2-5 6 tablet 0    guaiFENesin (MUCINEX) 1,200 mg Ta12 Take 1,200 mg by mouth every 12 (twelve) hours. for 5 days 10 tablet 0    levocetirizine (XYZAL) 5 MG tablet Take 1 tablet (5 mg total) by mouth every evening. 10 tablet 0     Facility-Administered Encounter Medications as of 3/17/2025   Medication Dose Route Frequency Provider Last Rate Last Admin    sodium hyaluronate (EUFLEXXA) 10 mg/mL(mw 2.4 -3.6 million) injection 20 mg  20 mg Intra-articular Weekly    20 mg at 03/13/25 0845

## 2025-03-17 NOTE — PATIENT INSTRUCTIONS
Symptomatic treatement regimen:  Plain mucinex, 1200mg twice per day  OTC antihistamine  Astelin nasal spray: 1 spray twice per day  Delsym cough syrup  Hot lemon and honey or honey substitute or hot tea  Warm salt water gargles  Throat lozenges  Chloraseptic spray  Rehydrate!  LOTS of WATER; if you want to drink 1-2 pedialyte or sugar free gatorade    If not feeling better in another 5 days, please return to clinic or go to ER.

## 2025-03-20 ENCOUNTER — OFFICE VISIT (OUTPATIENT)
Dept: SPORTS MEDICINE | Facility: CLINIC | Age: 80
End: 2025-03-20
Payer: MEDICARE

## 2025-03-20 VITALS — HEART RATE: 76 BPM | DIASTOLIC BLOOD PRESSURE: 86 MMHG | SYSTOLIC BLOOD PRESSURE: 128 MMHG

## 2025-03-20 DIAGNOSIS — M25.461 EFFUSION OF RIGHT KNEE: ICD-10-CM

## 2025-03-20 DIAGNOSIS — M17.11 PRIMARY OSTEOARTHRITIS OF RIGHT KNEE: Primary | ICD-10-CM

## 2025-03-20 PROCEDURE — 99999PBSHW PR PBB SHADOW TECHNICAL ONLY FILED TO HB: Mod: JZ,PBBFAC,,

## 2025-03-20 PROCEDURE — 20611 DRAIN/INJ JOINT/BURSA W/US: CPT | Mod: PBBFAC,PO | Performed by: NEUROMUSCULOSKELETAL MEDICINE & OMM

## 2025-03-20 PROCEDURE — 99213 OFFICE O/P EST LOW 20 MIN: CPT | Mod: PBBFAC,PO | Performed by: NEUROMUSCULOSKELETAL MEDICINE & OMM

## 2025-03-20 PROCEDURE — 99999 PR PBB SHADOW E&M-EST. PATIENT-LVL III: CPT | Mod: PBBFAC,,, | Performed by: NEUROMUSCULOSKELETAL MEDICINE & OMM

## 2025-03-20 RX ADMIN — Medication 20 MG: at 08:03

## 2025-03-20 NOTE — PROGRESS NOTES
"Subjective:     Blessing Salmon     Chief Complaint   Patient presents with    Right Knee - Pain     Blessing is a 79 y.o. female coming in today for their 3rd Euflexxa injection to the right knee.   Objective:   VITAL SIGNS: /86 (Patient Position: Sitting)   Pulse 76   LMP  (LMP Unknown)      Euflexxa Injection Procedure #3     A time out was performed, including verification of patient ID, procedure, site and side, availability of information and equipment, review of safety issues, and agreement with consent, the procedure site was marked.     Location: Knee joint, right     Procedure: The patient was prepped aseptically with alcohol and chlorhexidine. Ethyl Chloride spray was used prior to skin puncture to help numb the superficial skin. After cold spray was applied, 2 cc of 1% Lidocaine was injected into the skin and superficial tissue at the injection site using a 25 G, 1.5" needle to form an anesthetic tunnel.  After local anesthetic was applied a 18 G, 1.5 needle was used to enter the right knee joint capsule under US guidance. 8cc of clear, yellow synovial fluid was aspirated. Following aspiration, using a hemostat, the syringe was exchanged with the Euflexxa syringe, and 2cc of Euflexxa was injected into the right knee joint. The patient was in the supine position during the duration of this procedure and the injection approach was from the superolateral aspect.      Ultrasound guidance was used for needle localization with SonCSS Corpte Edge 2, 9-L MHz linear probe(s). Images were saved and stored for documentation. The right knee joint was well visualized.  Dynamic visualization of the needle(s) was continuous throughout the procedure and maintained good position and correct needle placement.        Patient tolerance: The patient tolerated the procedure well with no immediate complications. There were no adverse reactions to the medication. Patient was instructed to apply ice to the joint for up to 20 " minutes at a time and avoid strenuous activities for 24-36 hours following the injection. The patient was warned of possible blood pressure changes during that time. Following that time, the patient can resume activities as prior to the injection.     The patient was reminded to call the clinic immediately for any adverse side effects as explained in clinic today.     Euflexxa:  Lot: [id number]  Exp: 12/09/2025    Assessment:      Encounter Diagnoses   Name Primary?    Primary osteoarthritis of right knee Yes    Effusion of right knee         Plan:   1.third Euflexxa injection of right knee received today (see procedure note above).  Recommend continued right knee ace wrap wear daily for the next 1-2 weeks, then with increased activity. Pt has been wearing wrap over her pants due to skin irritation.    2. Follow-up in 4 months  3. Patient agreeable to today's plan and all questions were answered    This note is dictated using the M*Modal Fluency Direct word recognition program. There are word recognition mistakes that are occasionally missed on review.

## 2025-06-18 ENCOUNTER — TELEPHONE (OUTPATIENT)
Dept: SPORTS MEDICINE | Facility: CLINIC | Age: 80
End: 2025-06-18
Payer: MEDICARE

## 2025-06-18 NOTE — TELEPHONE ENCOUNTER
"Pt reports L sided back pain starting this morning. Reports "electric shock" down her leg to her ankle and foot. Calling for advice. States she has hx of right sided lumbar radiculopathy years ago treated with PT and series of spinal injections. Scheduled pt with pain management. She is taking meloxicam. Advised patient if the pain is severe and not controlled with NSAIDs she can go to urgent care. Pt states understanding and appreciation.     Ivania Salas, MS, ATC, OTC  Clinical Assistant to Dr. Pilar Griffin    "

## 2025-06-18 NOTE — TELEPHONE ENCOUNTER
Copied from CRM #[id number]. Topic: General Inquiry - Return Call  >> Jun 18, 2025  8:09 AM Mckenna wrote:  .1MEDICALADVICE     Patient is calling for Medical Advice regarding: Pt is calling in regards to needing to speak to the nurse please about a pinched nerve. Please call and advise. Thank you    How long has patient had these symptoms:N/A    Pharmacy name and phone#:N/A    Patient wants a call back or thru [facility], provide patient's call back phone number:[phone] Patient    Comments:    Please advise patient replies from provider may take up to 48 hours.

## 2025-06-23 DIAGNOSIS — Z00.00 ENCOUNTER FOR MEDICARE ANNUAL WELLNESS EXAM: ICD-10-CM

## 2025-07-23 ENCOUNTER — TELEPHONE (OUTPATIENT)
Dept: INTERNAL MEDICINE | Facility: CLINIC | Age: 80
End: 2025-07-23
Payer: MEDICARE

## 2025-07-23 DIAGNOSIS — Z96.1 HISTORY OF INTRAOCULAR LENS IMPLANT: ICD-10-CM

## 2025-07-23 DIAGNOSIS — H53.9 VISION CHANGES: Primary | ICD-10-CM

## 2025-07-23 NOTE — TELEPHONE ENCOUNTER
Copied from CRM #[id number]. Topic: General Inquiry - Patient Advice  >> Jul 23, 2025  2:27 PM Denisse wrote:  Type:  Patient Requesting Referral    Who Called:patient  Does the patient already have the specialty appointment scheduled?:n/a  If yes, what is the date of that appointment?:n/a  Referral to What Specialty:Eye surgeon or eye specialist who see patients with caratyrical eye implants  Reason for Referral:had eye implants  Does the patient want the referral with a specific physician?:n/a  Is the specialist [facility]?:n/a  Patient Requesting a Response?:yes  Would the patient rather a call back or a response via [facility]? call  Best Call Back Number:  Additional Information: